# Patient Record
Sex: FEMALE | Race: WHITE | NOT HISPANIC OR LATINO | Employment: OTHER | URBAN - NONMETROPOLITAN AREA
[De-identification: names, ages, dates, MRNs, and addresses within clinical notes are randomized per-mention and may not be internally consistent; named-entity substitution may affect disease eponyms.]

---

## 2020-03-09 ENCOUNTER — TRANSCRIBE ORDERS (OUTPATIENT)
Dept: ADMINISTRATIVE | Facility: HOSPITAL | Age: 60
End: 2020-03-09

## 2020-03-09 DIAGNOSIS — E04.1 NONTOXIC SINGLE THYROID NODULE: Primary | ICD-10-CM

## 2020-04-29 ENCOUNTER — TRANSCRIBE ORDERS (OUTPATIENT)
Dept: ADMINISTRATIVE | Facility: HOSPITAL | Age: 60
End: 2020-04-29

## 2020-04-29 DIAGNOSIS — E04.1 NONTOXIC SINGLE THYROID NODULE: Primary | ICD-10-CM

## 2020-04-30 ENCOUNTER — TRANSCRIBE ORDERS (OUTPATIENT)
Dept: ADMINISTRATIVE | Facility: HOSPITAL | Age: 60
End: 2020-04-30

## 2020-04-30 DIAGNOSIS — M79.671 PAIN IN RIGHT FOOT: Primary | ICD-10-CM

## 2020-05-01 ENCOUNTER — TRANSCRIBE ORDERS (OUTPATIENT)
Dept: ADMINISTRATIVE | Facility: HOSPITAL | Age: 60
End: 2020-05-01

## 2020-05-01 DIAGNOSIS — E04.1 NONTOXIC UNINODULAR GOITER: ICD-10-CM

## 2020-05-01 DIAGNOSIS — E04.1 NONTOXIC SINGLE THYROID NODULE: ICD-10-CM

## 2020-05-01 DIAGNOSIS — M79.671 PAIN IN RIGHT FOOT: Primary | ICD-10-CM

## 2020-05-01 DIAGNOSIS — M79.671 RIGHT FOOT PAIN: Primary | ICD-10-CM

## 2020-05-04 ENCOUNTER — HOSPITAL ENCOUNTER (OUTPATIENT)
Dept: ULTRASOUND IMAGING | Facility: HOSPITAL | Age: 60
Discharge: HOME | End: 2020-05-04
Admitting: FAMILY MEDICINE

## 2020-05-04 DIAGNOSIS — E04.1 NONTOXIC SINGLE THYROID NODULE: ICD-10-CM

## 2020-05-04 DIAGNOSIS — M79.671 PAIN IN RIGHT FOOT: ICD-10-CM

## 2020-05-04 PROCEDURE — 76536 US EXAM OF HEAD AND NECK: CPT

## 2020-05-04 PROCEDURE — 76536 US EXAM OF HEAD AND NECK: CPT | Performed by: RADIOLOGY

## 2021-01-14 ENCOUNTER — APPOINTMENT (OUTPATIENT)
Dept: GENERAL RADIOLOGY | Facility: HOSPITAL | Age: 61
End: 2021-01-14

## 2021-01-14 ENCOUNTER — HOSPITAL ENCOUNTER (EMERGENCY)
Facility: HOSPITAL | Age: 61
Discharge: HOME | End: 2021-01-14
Attending: EMERGENCY MEDICINE | Admitting: EMERGENCY MEDICINE

## 2021-01-14 VITALS
SYSTOLIC BLOOD PRESSURE: 187 MMHG | TEMPERATURE: 99 F | BODY MASS INDEX: 37.56 KG/M2 | HEART RATE: 82 BPM | RESPIRATION RATE: 18 BRPM | WEIGHT: 220 LBS | OXYGEN SATURATION: 96 % | DIASTOLIC BLOOD PRESSURE: 87 MMHG | HEIGHT: 64 IN

## 2021-01-14 DIAGNOSIS — R07.9 CHEST PAIN IN ADULT: Primary | ICD-10-CM

## 2021-01-14 LAB
ALBUMIN SERPL-MCNC: 4.25 G/DL (ref 3.5–5.2)
ALBUMIN/GLOB SERPL: 1.4 G/DL
ALP SERPL-CCNC: 82 U/L (ref 39–117)
ALT SERPL W P-5'-P-CCNC: 25 U/L (ref 1–33)
ANION GAP SERPL CALCULATED.3IONS-SCNC: 8.7 MMOL/L (ref 5–15)
AST SERPL-CCNC: 17 U/L (ref 1–32)
BASOPHILS # BLD AUTO: 0.04 10*3/MM3 (ref 0–0.2)
BASOPHILS NFR BLD AUTO: 0.4 % (ref 0–1.5)
BILIRUB SERPL-MCNC: 0.3 MG/DL (ref 0–1.2)
BUN SERPL-MCNC: 12 MG/DL (ref 6–20)
BUN/CREAT SERPL: 14.8 (ref 7–25)
CALCIUM SPEC-SCNC: 9.2 MG/DL (ref 8.6–10.5)
CHLORIDE SERPL-SCNC: 99 MMOL/L (ref 98–107)
CO2 SERPL-SCNC: 27.3 MMOL/L (ref 22–29)
CREAT SERPL-MCNC: 0.81 MG/DL (ref 0.57–1)
DEPRECATED RDW RBC AUTO: 43.2 FL (ref 37–54)
EOSINOPHIL # BLD AUTO: 0.27 10*3/MM3 (ref 0–0.4)
EOSINOPHIL NFR BLD AUTO: 2.5 % (ref 0.3–6.2)
ERYTHROCYTE [DISTWIDTH] IN BLOOD BY AUTOMATED COUNT: 13.3 % (ref 12.3–15.4)
GFR SERPL CREATININE-BSD FRML MDRD: 72 ML/MIN/1.73
GLOBULIN UR ELPH-MCNC: 3 GM/DL
GLUCOSE SERPL-MCNC: 119 MG/DL (ref 65–99)
HCT VFR BLD AUTO: 43 % (ref 34–46.6)
HGB BLD-MCNC: 13.1 G/DL (ref 12–15.9)
HOLD SPECIMEN: NORMAL
HOLD SPECIMEN: NORMAL
IMM GRANULOCYTES # BLD AUTO: 0.04 10*3/MM3 (ref 0–0.05)
IMM GRANULOCYTES NFR BLD AUTO: 0.4 % (ref 0–0.5)
LYMPHOCYTES # BLD AUTO: 2.09 10*3/MM3 (ref 0.7–3.1)
LYMPHOCYTES NFR BLD AUTO: 19.1 % (ref 19.6–45.3)
MCH RBC QN AUTO: 27 PG (ref 26.6–33)
MCHC RBC AUTO-ENTMCNC: 30.5 G/DL (ref 31.5–35.7)
MCV RBC AUTO: 88.7 FL (ref 79–97)
MONOCYTES # BLD AUTO: 0.63 10*3/MM3 (ref 0.1–0.9)
MONOCYTES NFR BLD AUTO: 5.8 % (ref 5–12)
NEUTROPHILS NFR BLD AUTO: 7.85 10*3/MM3 (ref 1.7–7)
NEUTROPHILS NFR BLD AUTO: 71.8 % (ref 42.7–76)
NRBC BLD AUTO-RTO: 0 /100 WBC (ref 0–0.2)
PLATELET # BLD AUTO: 218 10*3/MM3 (ref 140–450)
PMV BLD AUTO: 10.5 FL (ref 6–12)
POTASSIUM SERPL-SCNC: 3.9 MMOL/L (ref 3.5–5.2)
PROT SERPL-MCNC: 7.2 G/DL (ref 6–8.5)
RBC # BLD AUTO: 4.85 10*6/MM3 (ref 3.77–5.28)
SODIUM SERPL-SCNC: 135 MMOL/L (ref 136–145)
TROPONIN T SERPL-MCNC: <0.01 NG/ML (ref 0–0.03)
TROPONIN T SERPL-MCNC: <0.01 NG/ML (ref 0–0.03)
WBC # BLD AUTO: 10.92 10*3/MM3 (ref 3.4–10.8)
WHOLE BLOOD HOLD SPECIMEN: NORMAL
WHOLE BLOOD HOLD SPECIMEN: NORMAL

## 2021-01-14 PROCEDURE — 99283 EMERGENCY DEPT VISIT LOW MDM: CPT

## 2021-01-14 PROCEDURE — 93005 ELECTROCARDIOGRAM TRACING: CPT | Performed by: PHYSICIAN ASSISTANT

## 2021-01-14 PROCEDURE — 85025 COMPLETE CBC W/AUTO DIFF WBC: CPT | Performed by: PHYSICIAN ASSISTANT

## 2021-01-14 PROCEDURE — 71045 X-RAY EXAM CHEST 1 VIEW: CPT | Performed by: RADIOLOGY

## 2021-01-14 PROCEDURE — 71045 X-RAY EXAM CHEST 1 VIEW: CPT

## 2021-01-14 PROCEDURE — 84484 ASSAY OF TROPONIN QUANT: CPT | Performed by: PHYSICIAN ASSISTANT

## 2021-01-14 PROCEDURE — 93010 ELECTROCARDIOGRAM REPORT: CPT | Performed by: INTERNAL MEDICINE

## 2021-01-14 PROCEDURE — 80053 COMPREHEN METABOLIC PANEL: CPT | Performed by: PHYSICIAN ASSISTANT

## 2021-01-14 RX ORDER — ASPIRIN 81 MG/1
324 TABLET, CHEWABLE ORAL ONCE
Status: COMPLETED | OUTPATIENT
Start: 2021-01-14 | End: 2021-01-14

## 2021-01-14 RX ORDER — SODIUM CHLORIDE 0.9 % (FLUSH) 0.9 %
10 SYRINGE (ML) INJECTION AS NEEDED
Status: DISCONTINUED | OUTPATIENT
Start: 2021-01-14 | End: 2021-01-14 | Stop reason: HOSPADM

## 2021-01-14 RX ADMIN — ASPIRIN 324 MG: 81 TABLET, CHEWABLE ORAL at 16:42

## 2021-01-14 NOTE — ED PROVIDER NOTES
Subjective   This is a 60-year-old female who presents to the emergency department chief complaint left-sided chest pressure x2 days.  Patient states she has had intermittent left-sided chest pressure over the past 2 days.  States the pain is not worse with exertion.  Denies any associated dyspnea or shortness of breath.  No reported dizziness.  Patient does have history of hypertension.  Denies previous history of heart disease, hyperlipidemia and diabetes.       History provided by:  Patient   used: No    Chest Pain  Pain location:  L chest  Pain quality: pressure    Pain quality: not aching    Pain radiates to:  Does not radiate  Pain severity:  Moderate  Onset quality:  Gradual  Duration:  2 days  Timing:  Intermittent  Progression:  Worsening  Chronicity:  New  Context: not breathing, not drug use, not intercourse, not lifting, not movement, not raising an arm and not at rest    Relieved by:  Nothing  Worsened by:  Nothing  Ineffective treatments:  None tried  Associated symptoms: shortness of breath    Associated symptoms: no abdominal pain, no anorexia, no anxiety, no back pain, no claudication and no palpitations    Risk factors: no birth control, no diabetes mellitus, no hypertension, no immobilization, not male, no Marfan's syndrome, not pregnant, no prior DVT/PE, no smoking and no surgery        Review of Systems   Respiratory: Positive for chest tightness and shortness of breath.    Cardiovascular: Positive for chest pain. Negative for palpitations, claudication and leg swelling.   Gastrointestinal: Negative.  Negative for abdominal pain and anorexia.   Genitourinary: Negative.  Negative for flank pain and genital sores.   Musculoskeletal: Negative.  Negative for arthralgias, back pain, joint swelling and neck pain.   Skin: Negative.  Negative for color change, pallor and rash.   All other systems reviewed and are negative.      No past medical history on file.    Allergies   Allergen  Reactions   • Penicillins Anaphylaxis   • Flagyl [Metronidazole] Hives       No past surgical history on file.    No family history on file.    Social History     Socioeconomic History   • Marital status:      Spouse name: Not on file   • Number of children: Not on file   • Years of education: Not on file   • Highest education level: Not on file           Objective   Physical Exam  Vitals signs and nursing note reviewed.   Constitutional:       General: She is not in acute distress.     Appearance: She is well-developed and normal weight. She is not ill-appearing or toxic-appearing.   HENT:      Head: Normocephalic and atraumatic.   Eyes:      Extraocular Movements: Extraocular movements intact.      Pupils: Pupils are equal, round, and reactive to light.   Neck:      Musculoskeletal: Normal range of motion and neck supple.      Thyroid: No thyromegaly.      Vascular: No hepatojugular reflux or JVD.      Trachea: No tracheal deviation.   Cardiovascular:      Rate and Rhythm: Normal rate and regular rhythm.      Pulses:           Carotid pulses are 2+ on the right side and 2+ on the left side.       Radial pulses are 2+ on the right side and 2+ on the left side.        Dorsalis pedis pulses are 2+ on the right side and 2+ on the left side.        Posterior tibial pulses are 2+ on the right side and 2+ on the left side.      Heart sounds: Normal heart sounds. Heart sounds not distant.   Pulmonary:      Effort: Pulmonary effort is normal. No tachypnea, accessory muscle usage or respiratory distress.      Breath sounds: Normal breath sounds. No stridor. No decreased breath sounds or wheezing.   Chest:      Chest wall: No mass, deformity, tenderness, crepitus or edema. There is no dullness to percussion.   Abdominal:      General: Bowel sounds are normal. There is no abdominal bruit.      Palpations: Abdomen is soft. There is no fluid wave, hepatomegaly, splenomegaly or mass.      Tenderness: There is no  abdominal tenderness. There is no guarding or rebound.   Musculoskeletal: Normal range of motion.      Right lower leg: She exhibits no tenderness. No edema.      Left lower leg: She exhibits no tenderness. No edema.   Lymphadenopathy:      Cervical: No cervical adenopathy.   Skin:     General: Skin is warm and dry.      Capillary Refill: Capillary refill takes less than 2 seconds.      Coloration: Skin is not cyanotic or pale.      Findings: No ecchymosis, erythema or rash.      Nails: There is no clubbing.     Neurological:      General: No focal deficit present.      Mental Status: She is alert and oriented to person, place, and time.      Cranial Nerves: No cranial nerve deficit.      Motor: No weakness.   Psychiatric:         Mood and Affect: Mood normal. Mood is not anxious.         Behavior: Behavior normal. Behavior is not agitated.         Procedures           ED Course  ED Course as of Jan 14 1906   Thu Jan 14, 2021 1904 IMPRESSION:    Unremarkable exam. No acute cardiopulmonary findings identified.    []   1904 60-year-old female that presents to the emergency department chief complaint left sided chest pain that has been present for the last couple days.  Patient did have negative cardiac work-up.  She is advised to follow-up with primary care physician.  Patient will be discharged at this time.    []      ED Course User Index  [] Harry Dillon PA-C                                         HEART Score (for prediction of 6-week risk of major adverse cardiac event) reviewed and/or performed as part of the patient evaluation and treatment planning process.  The result associated with this review/performance is: 2       MDM    Final diagnoses:   Chest pain in adult            Harry Dillon PA-C  01/14/21 1906

## 2021-01-15 LAB
QT INTERVAL: 376 MS
QTC INTERVAL: 436 MS

## 2021-01-22 ENCOUNTER — TRANSCRIBE ORDERS (OUTPATIENT)
Dept: ADMINISTRATIVE | Facility: HOSPITAL | Age: 61
End: 2021-01-22

## 2021-01-22 DIAGNOSIS — R10.12 LEFT UPPER QUADRANT PAIN: Primary | ICD-10-CM

## 2021-01-29 ENCOUNTER — HOSPITAL ENCOUNTER (OUTPATIENT)
Dept: CT IMAGING | Facility: HOSPITAL | Age: 61
Discharge: HOME | End: 2021-01-29

## 2021-01-29 ENCOUNTER — HOSPITAL ENCOUNTER (OUTPATIENT)
Dept: MAMMOGRAPHY | Facility: HOSPITAL | Age: 61
Discharge: HOME | End: 2021-01-29

## 2021-01-29 DIAGNOSIS — R10.12 LEFT UPPER QUADRANT PAIN: ICD-10-CM

## 2021-01-29 DIAGNOSIS — Z12.31 VISIT FOR SCREENING MAMMOGRAM: ICD-10-CM

## 2021-01-29 PROCEDURE — 77063 BREAST TOMOSYNTHESIS BI: CPT | Performed by: RADIOLOGY

## 2021-01-29 PROCEDURE — 74150 CT ABDOMEN W/O CONTRAST: CPT | Performed by: RADIOLOGY

## 2021-01-29 PROCEDURE — 74150 CT ABDOMEN W/O CONTRAST: CPT

## 2021-01-29 PROCEDURE — 77067 SCR MAMMO BI INCL CAD: CPT | Performed by: RADIOLOGY

## 2021-01-29 PROCEDURE — 77067 SCR MAMMO BI INCL CAD: CPT

## 2021-01-29 PROCEDURE — 77063 BREAST TOMOSYNTHESIS BI: CPT

## 2021-02-23 ENCOUNTER — OFFICE VISIT (OUTPATIENT)
Dept: FAMILY MEDICINE CLINIC | Facility: CLINIC | Age: 61
End: 2021-02-23

## 2021-02-23 VITALS
HEART RATE: 82 BPM | WEIGHT: 255 LBS | SYSTOLIC BLOOD PRESSURE: 130 MMHG | RESPIRATION RATE: 18 BRPM | OXYGEN SATURATION: 96 % | DIASTOLIC BLOOD PRESSURE: 80 MMHG | HEIGHT: 64 IN | TEMPERATURE: 97.8 F | BODY MASS INDEX: 43.54 KG/M2

## 2021-02-23 DIAGNOSIS — I10 ESSENTIAL HYPERTENSION: Chronic | ICD-10-CM

## 2021-02-23 DIAGNOSIS — N20.0 RENAL STONES: Primary | ICD-10-CM

## 2021-02-23 DIAGNOSIS — F33.0 MILD EPISODE OF RECURRENT MAJOR DEPRESSIVE DISORDER (HCC): ICD-10-CM

## 2021-02-23 DIAGNOSIS — F41.9 CHRONIC ANXIETY: ICD-10-CM

## 2021-02-23 PROBLEM — H81.13 BENIGN PAROXYSMAL POSITIONAL VERTIGO DUE TO BILATERAL VESTIBULAR DISORDER: Status: ACTIVE | Noted: 2021-02-23

## 2021-02-23 PROBLEM — F32.9 CURRENT EPISODE OF MAJOR DEPRESSIVE DISORDER WITHOUT PRIOR EPISODE: Chronic | Status: ACTIVE | Noted: 2021-02-23

## 2021-02-23 PROBLEM — F32.9 CURRENT EPISODE OF MAJOR DEPRESSIVE DISORDER WITHOUT PRIOR EPISODE: Status: ACTIVE | Noted: 2021-02-23

## 2021-02-23 PROBLEM — F41.0 ANXIETY ATTACK: Chronic | Status: ACTIVE | Noted: 2021-02-23

## 2021-02-23 PROBLEM — F32.9 CURRENT EPISODE OF MAJOR DEPRESSIVE DISORDER WITHOUT PRIOR EPISODE: Chronic | Status: RESOLVED | Noted: 2021-02-23 | Resolved: 2021-02-23

## 2021-02-23 PROBLEM — F41.0 ANXIETY ATTACK: Chronic | Status: RESOLVED | Noted: 2021-02-23 | Resolved: 2021-02-23

## 2021-02-23 PROBLEM — F41.0 ANXIETY ATTACK: Status: ACTIVE | Noted: 2021-02-23

## 2021-02-23 PROBLEM — H81.13 BENIGN PAROXYSMAL POSITIONAL VERTIGO DUE TO BILATERAL VESTIBULAR DISORDER: Chronic | Status: ACTIVE | Noted: 2021-02-23

## 2021-02-23 PROCEDURE — 99203 OFFICE O/P NEW LOW 30 MIN: CPT | Performed by: FAMILY MEDICINE

## 2021-02-23 PROCEDURE — 81001 URINALYSIS AUTO W/SCOPE: CPT | Performed by: FAMILY MEDICINE

## 2021-02-23 PROCEDURE — 87086 URINE CULTURE/COLONY COUNT: CPT | Performed by: FAMILY MEDICINE

## 2021-02-23 PROCEDURE — 36415 COLL VENOUS BLD VENIPUNCTURE: CPT | Performed by: FAMILY MEDICINE

## 2021-02-23 PROCEDURE — 80053 COMPREHEN METABOLIC PANEL: CPT | Performed by: FAMILY MEDICINE

## 2021-02-23 RX ORDER — NADOLOL 40 MG/1
40 TABLET ORAL DAILY
COMMUNITY
Start: 2021-02-15 | End: 2021-05-05 | Stop reason: SDUPTHER

## 2021-02-23 RX ORDER — ESCITALOPRAM OXALATE 10 MG/1
10 TABLET ORAL DAILY
COMMUNITY
Start: 2021-02-15 | End: 2021-05-05 | Stop reason: SDUPTHER

## 2021-02-23 NOTE — PROGRESS NOTES
Subjective   Ana Lilia Leach is a 60 y.o. female.     History of Present Illness comes in to establish care.  Past medical history of hypertension BPPV depression anxiety with superimposed panic attacks.  Has had some surgical procedures as noted in record.  Recent [facility] emergency room visits.  Recent abdominal imaging demonstrating right-sided ureteral stone as well as left sided renal pelvic stones.  At present denies respiratory GI CDV  symptoms.  Interestingly has had no pain consistent with renal colic.  No prior history of renal stones.  Because of the anxiety and panic usually associated with the BPPV has episodically taken lorazepam in past.    The following portions of the patient's history were reviewed and updated as appropriate: allergies, current medications, past family history, past medical history, past surgical history and problem list.    Review of Systems  See history of Present Illness     Objective     Physical Exam  Vitals signs reviewed.   Constitutional:       Appearance: Normal appearance.   HENT:      Head: Normocephalic.      Right Ear: External ear normal.      Left Ear: External ear normal.   Neck:      Musculoskeletal: Normal range of motion and neck supple.   Cardiovascular:      Rate and Rhythm: Normal rate and regular rhythm.      Heart sounds: Normal heart sounds.   Pulmonary:      Effort: Pulmonary effort is normal.      Breath sounds: Normal breath sounds.   Skin:     General: Skin is warm and dry.   Neurological:      Mental Status: She is alert and oriented to person, place, and time.   Psychiatric:         Mood and Affect: Mood normal.         PHQ-9 Total Score: 0    Patient's Body mass index is 43.75 kg/m². BMI is above normal parameters. Recommendations include: exercise counseling and nutrition counseling.   (Normal BMI:  18.5-24.9, OW 25-29.9, Obesity 30 or greater)      Assessment/Plan     Diagnoses and all orders for this visit:    1. Renal stones  (Primary)  -     Comprehensive Metabolic Panel  -     Urinalysis With Culture If Indicated - Urine, Clean Catch  -     Ambulatory Referral to Urology    2. Essential hypertension  -     Comprehensive Metabolic Panel    3. Mild episode of recurrent major depressive disorder (CMS/HCC)  -     Ambulatory Referral to Psychiatry    4. Chronic anxiety  -     Ambulatory Referral to Psychiatry    Today we will check urinalysis as well as follow-up chemistry panel to monitor renal function.  We reviewed available records including ER visit CT imaging.  Will make referral to urology.  In regard to the depression with anxiety panic attacks will make referral to psychiatry for further medication management.  Recheck here in 2 months or as needed.  Will of course notify you of results and appointments.                     This document has been electronically signed by Hank Marin MD   February 23, 2021 16:37 EST    Part of this note may be an electronic transcription/translation of spoken language to printed text using the Dragon Dictation System.

## 2021-02-24 ENCOUNTER — HOSPITAL ENCOUNTER (OUTPATIENT)
Dept: GENERAL RADIOLOGY | Facility: HOSPITAL | Age: 61
Discharge: HOME | End: 2021-02-24
Admitting: UROLOGY

## 2021-02-24 ENCOUNTER — OFFICE VISIT (OUTPATIENT)
Dept: UROLOGY | Facility: CLINIC | Age: 61
End: 2021-02-24

## 2021-02-24 ENCOUNTER — HOSPITAL ENCOUNTER (OUTPATIENT)
Facility: HOSPITAL | Age: 61
Setting detail: HOSPITAL OUTPATIENT SURGERY
End: 2021-02-24
Attending: UROLOGY | Admitting: UROLOGY

## 2021-02-24 VITALS — BODY MASS INDEX: 43.71 KG/M2 | TEMPERATURE: 97.6 F | HEIGHT: 64 IN | WEIGHT: 256 LBS

## 2021-02-24 DIAGNOSIS — N20.0 KIDNEY STONES: Primary | ICD-10-CM

## 2021-02-24 DIAGNOSIS — N26.1 RENAL ATROPHY, RIGHT: ICD-10-CM

## 2021-02-24 DIAGNOSIS — N39.0 URINARY TRACT INFECTION WITHOUT HEMATURIA, SITE UNSPECIFIED: ICD-10-CM

## 2021-02-24 DIAGNOSIS — N20.1 URETERAL STONE: ICD-10-CM

## 2021-02-24 LAB
ALBUMIN SERPL-MCNC: 4 G/DL (ref 3.5–5.2)
ALBUMIN/GLOB SERPL: 1.4 G/DL
ALP SERPL-CCNC: 68 U/L (ref 39–117)
ALT SERPL W P-5'-P-CCNC: 20 U/L (ref 1–33)
ANION GAP SERPL CALCULATED.3IONS-SCNC: 10.1 MMOL/L (ref 5–15)
AST SERPL-CCNC: 16 U/L (ref 1–32)
BACTERIA UR QL AUTO: ABNORMAL /HPF
BILIRUB SERPL-MCNC: 0.2 MG/DL (ref 0–1.2)
BILIRUB UR QL STRIP: NEGATIVE
BUN SERPL-MCNC: 11 MG/DL (ref 6–20)
BUN/CREAT SERPL: 17.7 (ref 7–25)
CALCIUM SPEC-SCNC: 8.9 MG/DL (ref 8.6–10.5)
CHLORIDE SERPL-SCNC: 100 MMOL/L (ref 98–107)
CLARITY UR: CLEAR
CO2 SERPL-SCNC: 26.9 MMOL/L (ref 22–29)
COLOR UR: YELLOW
CREAT SERPL-MCNC: 0.62 MG/DL (ref 0.57–1)
GFR SERPL CREATININE-BSD FRML MDRD: 99 ML/MIN/1.73
GLOBULIN UR ELPH-MCNC: 2.8 GM/DL
GLUCOSE SERPL-MCNC: 96 MG/DL (ref 65–99)
GLUCOSE UR STRIP-MCNC: NEGATIVE MG/DL
HGB UR QL STRIP.AUTO: ABNORMAL
HYALINE CASTS UR QL AUTO: ABNORMAL /LPF
KETONES UR QL STRIP: NEGATIVE
LEUKOCYTE ESTERASE UR QL STRIP.AUTO: ABNORMAL
NITRITE UR QL STRIP: NEGATIVE
PH UR STRIP.AUTO: 6.5 [PH] (ref 5–8)
POTASSIUM SERPL-SCNC: 4.5 MMOL/L (ref 3.5–5.2)
PROT SERPL-MCNC: 6.8 G/DL (ref 6–8.5)
PROT UR QL STRIP: NEGATIVE
RBC # UR: ABNORMAL /HPF
REF LAB TEST METHOD: ABNORMAL
SODIUM SERPL-SCNC: 137 MMOL/L (ref 136–145)
SP GR UR STRIP: 1.01 (ref 1–1.03)
SQUAMOUS #/AREA URNS HPF: ABNORMAL /HPF
UROBILINOGEN UR QL STRIP: ABNORMAL
WBC UR QL AUTO: ABNORMAL /HPF

## 2021-02-24 PROCEDURE — 74018 RADEX ABDOMEN 1 VIEW: CPT | Performed by: RADIOLOGY

## 2021-02-24 PROCEDURE — 99204 OFFICE O/P NEW MOD 45 MIN: CPT | Performed by: UROLOGY

## 2021-02-24 PROCEDURE — 74018 RADEX ABDOMEN 1 VIEW: CPT

## 2021-02-24 RX ORDER — SULFAMETHOXAZOLE AND TRIMETHOPRIM 400; 80 MG/1; MG/1
1 TABLET ORAL 2 TIMES DAILY
Qty: 30 TABLET | Refills: 0 | Status: SHIPPED | OUTPATIENT
Start: 2021-02-24 | End: 2021-04-26

## 2021-02-24 NOTE — PROGRESS NOTES
Chief Complaint:          stones    HPI:   60 y.o. female.  Pt has epigastric pain radiating to the back.  Pt denies flank pain.  Pt had a ct scan that showed a large 1.5 cm right renal pelvic stone.   With marked right renal atrophy with no significant hydronephrosis.  Pt's renal function is 0.62 creat.  HPI      Past Medical History:        Past Medical History:   Diagnosis Date   • Anxiety    • Headache    • Hypertension    • PVC's (premature ventricular contractions)    • Thyroid disease          Current Meds:     Current Outpatient Medications   Medication Sig Dispense Refill   • escitalopram (LEXAPRO) 10 MG tablet Take 10 mg by mouth Daily.     • nadolol (CORGARD) 40 MG tablet Take 40 mg by mouth Daily.       No current facility-administered medications for this visit.         Allergies:      Allergies   Allergen Reactions   • Penicillins Anaphylaxis   • Flagyl [Metronidazole] Hives   • Aspirin Anxiety        Past Surgical History:     Past Surgical History:   Procedure Laterality Date   • GALLBLADDER SURGERY     • OVARY SURGERY Right    • TUBAL ABDOMINAL LIGATION           Social History:     Social History     Socioeconomic History   • Marital status:      Spouse name: Not on file   • Number of children: Not on file   • Years of education: Not on file   • Highest education level: Not on file   Tobacco Use   • Smoking status: Former Smoker     Quit date: 2009     Years since quittin.0   Substance and Sexual Activity   • Alcohol use: Never     Frequency: Never   • Drug use: Never   • Sexual activity: Not Currently     Partners: Female, Male       Family History:     Family History   Problem Relation Age of Onset   • Arthritis Mother    • Diabetes Mother    • Cancer Mother    • Hypertension Mother    • Cancer Sister    • Diabetes Son    • Breast cancer Neg Hx        Review of Systems:     Review of Systems   Constitutional: Negative for chills, fatigue and fever.   HENT: Negative for  congestion and sinus pressure.    Respiratory: Negative for chest tightness and shortness of breath.    Cardiovascular: Negative for chest pain.   Gastrointestinal: Positive for abdominal pain. Negative for constipation, diarrhea, nausea and vomiting.   Genitourinary: Negative for dysuria, frequency, hematuria and urgency.   Musculoskeletal: Negative for back pain and neck pain.   Neurological: Positive for headaches. Negative for dizziness.   Hematological: Bruises/bleeds easily.   Psychiatric/Behavioral: The patient is not nervous/anxious.          Physical Exam:     Physical Exam  Constitutional:       Appearance: She is well-developed.      Comments: Morbidly obese   HENT:      Head: Normocephalic.      Right Ear: External ear normal.      Left Ear: External ear normal.      Nose: Nose normal.   Eyes:      Conjunctiva/sclera: Conjunctivae normal.      Pupils: Pupils are equal, round, and reactive to light.   Neck:      Musculoskeletal: Normal range of motion and neck supple.      Thyroid: No thyromegaly.      Trachea: No tracheal deviation.   Cardiovascular:      Heart sounds: Normal heart sounds. No murmur. No friction rub. No gallop.    Pulmonary:      Effort: Pulmonary effort is normal. No respiratory distress.      Breath sounds: Normal breath sounds. No wheezing or rales.   Chest:      Chest wall: No tenderness.   Abdominal:      General: Bowel sounds are normal. There is no distension.      Palpations: Abdomen is soft. There is no mass.      Tenderness: There is no abdominal tenderness. There is no guarding or rebound.      Hernia: No hernia is present.   Genitourinary:     Vagina: Normal.   Musculoskeletal: Normal range of motion.   Lymphadenopathy:      Cervical: No cervical adenopathy.   Skin:     General: Skin is warm.      Findings: No rash.   Neurological:      Mental Status: She is alert and oriented to person, place, and time.      Cranial Nerves: No cranial nerve deficit.      Motor: No abnormal  "muscle tone.      Coordination: Coordination normal.      Deep Tendon Reflexes: Reflexes normal.   Psychiatric:         Judgment: Judgment normal.         Ht 162.6 cm (64.02\")   Wt 116 kg (256 lb)   BMI 43.91 kg/m²    Procedure:         Assessment:     Encounter Diagnosis   Name Primary?   • Kidney stones Yes       Orders Placed This Encounter   Procedures   • XR Abdomen KUB     Order Specific Question:   Reason for Exam:     Answer:   kidney stones       Patient reports that she is not currently experiencing any symptoms of urinary incontinence.      Plan:   I would recommend treating the large ureteral stone on the right but I do not expect the right kidney to recover much from her degree of atrophy.  I would recommend right ureteroscopy laser lithotripsy and stent placement.  Down the line I would treat the left side.    Patient's Body mass index is 43.91 kg/m². BMI is above normal parameters. Recommendations include: referral to primary care.      Smoking Cessation Counseling:  Never a smoker.  Patient is going to follow up with PCP to discuss treatment/medication options to quit tobacco use.     Counseling was given to patient for the following topics instructions for management as follows: ureteral stone. The interim medical history and current results were reviewed.  A treatment plan with follow-up was made for Kidney stones [N20.0]   This document has been electronically signed by Mateus Quevedo MD February 24, 2021 13:56 EST      "

## 2021-02-25 ENCOUNTER — TELEPHONE (OUTPATIENT)
Dept: FAMILY MEDICINE CLINIC | Facility: CLINIC | Age: 61
End: 2021-02-25

## 2021-02-25 LAB — BACTERIA SPEC AEROBE CULT: NORMAL

## 2021-03-01 NOTE — TELEPHONE ENCOUNTER
PT CALLED STATING SHE WAS TOLD THAT SHE COULD NOT SEE ANOTHER PROVIDER IN DR [facility]BERTO OFFICE    PT STATED SHE WAS UPSET ABOUT THIS     PT STATED SHE FOUND ANOTHER UROLOGIST IN [place]- [facility] WITH [facility]    PT CALL NUMBER: [phone]

## 2021-03-02 ENCOUNTER — APPOINTMENT (OUTPATIENT)
Dept: PREADMISSION TESTING | Facility: HOSPITAL | Age: 61
End: 2021-03-02

## 2021-03-02 DIAGNOSIS — N20.0 RENAL STONES: Primary | ICD-10-CM

## 2021-03-02 NOTE — TELEPHONE ENCOUNTER
I am unfamiliar with her requested provider.  Please be sure she is referred to physician and not midlevel provider for this concern.

## 2021-03-09 ENCOUNTER — OFFICE VISIT (OUTPATIENT)
Dept: UROLOGY | Facility: CLINIC | Age: 61
End: 2021-03-09

## 2021-03-09 VITALS — BODY MASS INDEX: 43.66 KG/M2 | WEIGHT: 255.73 LBS | HEIGHT: 64 IN | TEMPERATURE: 98.4 F

## 2021-03-09 DIAGNOSIS — N20.1 URETERAL STONE: ICD-10-CM

## 2021-03-09 DIAGNOSIS — N20.0 RENAL STONES: Primary | ICD-10-CM

## 2021-03-09 PROCEDURE — 99214 OFFICE O/P EST MOD 30 MIN: CPT | Performed by: UROLOGY

## 2021-03-09 NOTE — PROGRESS NOTES
Chief Complaint:          Chief Complaint   Patient presents with   • Nephrolithiasis     wants 2nd opinion       HPI:   60 y.o. female here for second opinion.  She has a right number of the kidney with a 10 mm proximal ureteral stone and a left kidney with compensatory hypertrophy and a small concretion of stones in the lower pole calyx there is no hydronephrosis, no infections, no pain, clearly because the compensatory hypertrophy of the stone on the right has been present although she has no recollection of it.  It may have been when she was pregnant.  I discussed the indications for surgical intervention am to get a nuclear medicine scan to determine the function to see if we need to even address it if not I will recommend observation none monitoring for metabolic activity I will see her back in 6 months pending the results of the scan    Past Medical History:        Past Medical History:   Diagnosis Date   • Anxiety    • Headache    • Hypertension    • Kidney stone    • PVC's (premature ventricular contractions)    • Thyroid disease          Current Meds:     Current Outpatient Medications   Medication Sig Dispense Refill   • escitalopram (LEXAPRO) 10 MG tablet Take 10 mg by mouth Daily.     • nadolol (CORGARD) 40 MG tablet Take 40 mg by mouth Daily.     • sulfamethoxazole-trimethoprim (Bactrim) 400-80 MG tablet Take 1 tablet by mouth 2 (Two) Times a Day. 30 tablet 0     No current facility-administered medications for this visit.        Allergies:      Allergies   Allergen Reactions   • Penicillins Anaphylaxis   • Flagyl [Metronidazole] Hives   • Aspirin Anxiety        Past Surgical History:     Past Surgical History:   Procedure Laterality Date   • GALLBLADDER SURGERY     • OVARY SURGERY Right    • TUBAL ABDOMINAL LIGATION           Social History:     Social History     Socioeconomic History   • Marital status:      Spouse name: Not on file   • Number of children: Not on file   • Years of education:  Not on file   • Highest education level: Not on file   Tobacco Use   • Smoking status: Former Smoker     Quit date: 2009     Years since quittin.0   • Smokeless tobacco: Never Used   Substance and Sexual Activity   • Alcohol use: Never   • Drug use: Never   • Sexual activity: Not Currently     Partners: Female, Male       Family History:     Family History   Problem Relation Age of Onset   • Arthritis Mother    • Diabetes Mother    • Cancer Mother    • Hypertension Mother    • Cancer Sister    • Diabetes Son    • Breast cancer Neg Hx        Review of Systems:     Review of Systems   Constitutional: Negative.  Negative for activity change, appetite change, chills, diaphoresis, fatigue and unexpected weight change.   HENT: Negative for congestion, dental problem, drooling, ear discharge, ear pain, facial swelling, hearing loss, mouth sores, nosebleeds, postnasal drip, rhinorrhea, sinus pressure, sneezing, sore throat, tinnitus, trouble swallowing and voice change.    Eyes: Negative.  Negative for photophobia, pain, discharge, redness, itching and visual disturbance.   Respiratory: Negative.  Negative for apnea, cough, choking, chest tightness, shortness of breath, wheezing and stridor.    Cardiovascular: Negative.  Negative for chest pain, palpitations and leg swelling.   Gastrointestinal: Negative.  Negative for abdominal distention, abdominal pain, anal bleeding, blood in stool, constipation, diarrhea, nausea, rectal pain and vomiting.   Endocrine: Negative.  Negative for cold intolerance, heat intolerance, polydipsia, polyphagia and polyuria.   Musculoskeletal: Negative.  Negative for arthralgias, back pain, gait problem, joint swelling, myalgias, neck pain and neck stiffness.   Skin: Negative.  Negative for color change, pallor, rash and wound.   Allergic/Immunologic: Negative.  Negative for environmental allergies, food allergies and immunocompromised state.   Neurological: Negative.  Negative for  dizziness, tremors, seizures, syncope, facial asymmetry, speech difficulty, weakness, light-headedness, numbness and headaches.   Hematological: Negative.  Negative for adenopathy. Does not bruise/bleed easily.   Psychiatric/Behavioral: Negative for agitation, behavioral problems, confusion, decreased concentration, dysphoric mood, hallucinations, self-injury, sleep disturbance and suicidal ideas. The patient is not nervous/anxious and is not hyperactive.    All other systems reviewed and are negative.      Physical Exam:     Physical Exam  Constitutional:       Appearance: She is well-developed.   HENT:      Head: Normocephalic and atraumatic.      Right Ear: External ear normal.      Left Ear: External ear normal.   Eyes:      Conjunctiva/sclera: Conjunctivae normal.      Pupils: Pupils are equal, round, and reactive to light.   Cardiovascular:      Rate and Rhythm: Normal rate and regular rhythm.      Heart sounds: Normal heart sounds.   Pulmonary:      Effort: Pulmonary effort is normal.      Breath sounds: Normal breath sounds.   Abdominal:      General: Bowel sounds are normal. There is no distension.      Palpations: Abdomen is soft. There is no mass.      Tenderness: There is no abdominal tenderness. There is no guarding or rebound.   Genitourinary:     Vagina: No vaginal discharge.   Musculoskeletal:         General: Normal range of motion.   Skin:     General: Skin is warm and dry.   Neurological:      Mental Status: She is alert.      Deep Tendon Reflexes: Reflexes are normal and symmetric.   Psychiatric:         Behavior: Behavior normal.         Thought Content: Thought content normal.         Judgment: Judgment normal.         I have reviewed the following portions of the patient's history: allergies, current medications, past family history, past medical history, past social history, past surgical history, problem list and ROS and confirm it's accurate.      Procedure:       Assessment/Plan:      Nonfunctional right kidney with 10 mm proximal stone probably present for a long time based on the compensatory hypertrophy of the contralateral kidney recommend observation we will check a nuclear medicine scan to determine percent function of is less than 10% I recommend observation  Renal calculus-we discussed the presence of the stone we discussed the various therapeutic options available including percutaneous nephrostolithotomy, lithotripsy.  We discussed the risks of lithotripsy including the passage of stones the development of a large string of stones in the distal ureter known as Steinstrasse.  In the 3% incidence of that we will need to proceed with a ureteroscopy for obstructing fragments.  Extremely rare incidence of renal hematoma.  And the significance of this.  We discussed the absolute relative indicators for intervention including the presence of sepsis, and pain we cannot control is the primary need for urgent intervention.  We discussed placement of a stent if indicated and the management of the stent as well.  Is a right lower pole collection of nonobstructing stones with no infection and no complications or pain recommend observation            Patient's Body mass index is 43.87 kg/m². BMI is above normal parameters. Recommendations include: educational material.              This document has been electronically signed by STEPHANIE SARKAR MD March 9, 2021 13:14 EST

## 2021-03-17 ENCOUNTER — OFFICE VISIT (OUTPATIENT)
Dept: PSYCHIATRY | Facility: CLINIC | Age: 61
End: 2021-03-17

## 2021-03-17 VITALS
HEART RATE: 93 BPM | DIASTOLIC BLOOD PRESSURE: 82 MMHG | TEMPERATURE: 98 F | WEIGHT: 250 LBS | HEIGHT: 64 IN | SYSTOLIC BLOOD PRESSURE: 178 MMHG | BODY MASS INDEX: 42.68 KG/M2

## 2021-03-17 DIAGNOSIS — F33.1 MODERATE EPISODE OF RECURRENT MAJOR DEPRESSIVE DISORDER (HCC): ICD-10-CM

## 2021-03-17 DIAGNOSIS — F41.1 GENERALIZED ANXIETY DISORDER: Primary | ICD-10-CM

## 2021-03-17 PROCEDURE — 90792 PSYCH DIAG EVAL W/MED SRVCS: CPT | Performed by: NURSE PRACTITIONER

## 2021-03-17 NOTE — PROGRESS NOTES
Subjective   Ana Lilia Leach is a 60 y.o. female who is here today for initial appointment to evaluate for medication options after being referred by Dr Marin for anxiety.     Chief Complaint:      History of Present Illness  She states that she has chronic anxiety with dizziness.  She states that for a very long time she was a mess, she thought she was losing her mind and she was prescribed lexapro which has been very helpful for her.  She states that she is most interested in starting back on ativan which she was on in [place] for panic associated with dizziness.  She states that the dizziness is related to medical issues and she receives benefits for that.  Discussed with patient that ativan is often associated with dizziness but patient argued and states that was the only thing that helped her, she was previously on diazepam but patient states that it took too long for it to help her and while she was in the ER once she was given ativan and it helped a lot quicker.  Discussed with patient other options that are available to help with panic disorder as BNZ treatment is not the first line of treatment for her symptoms, she became upset and stated that she did not like to be categorized with drug abusers in [place].  Reviewed other options with patient for her anxiety, including vistaril and other non-controlled/nonaddictive medications for the anxiety but patient states that she was not interested in that type of medication because it made her more fatigued with her current health issues.  Also, reviewed possible therapy to address her panic disorder with techniques to help reduce the severity of symptoms but patient stated that it was not helpful in a time of panic.  She refused all alternatives and commented that she was going to have to contact her son in [place] to get ativan to mail to [place] so that she could take them illegally. She left the office stating that this was a waste of her time.       The rest of the information was gathered from her psychosocial.      Past Psych History:  Denies any history of trauma or abuse.     Previous Psych Meds: diazepam, lorazepam    Substance Abuse:  Smoking history with LAURA 2009: 1-2 ppd    Social History: She is currently renting in [facility] with some college, she moved from [place] and is disabled,  X 1 year.  Close relationship with her two adult sons.      Family Psychiatric History: Son has MH issues    Family Medical History: Arthritis in her mother; Cancer in her mother and sister; Diabetes in her mother and son; Hypertension in her mother.    Medical/Surgical History:  BC: tubal ligation.     Past Medical History:   Diagnosis Date   • Anxiety    • Headache    • Hypertension    • Kidney stone    • PVC's (premature ventricular contractions)    • Thyroid disease      Past Surgical History:   Procedure Laterality Date   • GALLBLADDER SURGERY     • OVARY SURGERY Right    • TUBAL ABDOMINAL LIGATION         Allergies   Allergen Reactions   • Penicillins Anaphylaxis   • Flagyl [Metronidazole] Hives   • Aspirin Anxiety       Current Medications:   Current Outpatient Medications   Medication Sig Dispense Refill   • escitalopram (LEXAPRO) 10 MG tablet Take 10 mg by mouth Daily.     • nadolol (CORGARD) 40 MG tablet Take 40 mg by mouth Daily.     • sulfamethoxazole-trimethoprim (Bactrim) 400-80 MG tablet Take 1 tablet by mouth 2 (Two) Times a Day. 30 tablet 0     No current facility-administered medications for this visit.     Review of Systems   Constitutional: Negative for appetite change, chills, diaphoresis, fatigue, fever and unexpected weight change.   HENT: Negative for hearing loss, sore throat, trouble swallowing and voice change.    Eyes: Negative for photophobia and visual disturbance.   Respiratory: Negative for cough, chest tightness and shortness of breath.    Cardiovascular: Negative for chest pain and palpitations.   Gastrointestinal:  "Negative for abdominal pain, constipation, nausea and vomiting.   Endocrine: Negative for cold intolerance and heat intolerance.   Genitourinary: Negative for dysuria and frequency.   Musculoskeletal: Negative for arthralgias, back pain, joint swelling and neck stiffness.   Skin: Negative for color change and wound.   Allergic/Immunologic: Negative for environmental allergies and immunocompromised state.   Neurological: Negative for dizziness, tremors, seizures, syncope, weakness, light-headedness and headaches.   Hematological: Negative for adenopathy. Does not bruise/bleed easily.        Objective   Physical Exam  Vitals reviewed.   Constitutional:       General: She is not in acute distress.     Appearance: She is well-developed.   Neurological:      Mental Status: She is alert.      Coordination: Coordination normal.      Gait: Gait normal.       Blood pressure 178/82, pulse 93, temperature 98 °F (36.7 °C), temperature source Temporal, height 162.6 cm (64.02\"), weight 113 kg (250 lb).    Mental Status Exam:   Hygiene:   good  Cooperation:  Agitated  Eye Contact:  Fair  Psychomotor Behavior:  Aggitated  Affect:  Restricted  Hopelessness: Denies  Speech:  Normal  Thought Process:  Linear  Thought Content:  Mood congruent  Suicidal:  None  Homicidal:  None  Hallucinations:  None  Delusion:  None  Memory:  Intact  Orientation:  Person, Place, Time and Situation  Reliability:  fair  Insight:  Fair  Judgement:  Fair  Impulse Control:  Fair  Physical/Medical Issues:  No     Short-term goals: Patient will be compliant with clinic appointments.  Patient will be engaged in therapy, medication compliant with minimal side effects. Patient  will report decrease of symptoms and frequency.    Long-term goals: Patient will have minimal symptoms of  with continued medication management. Patient will be compliant with treatment and appointments.       Problem list: Anxiety  Strengths: taking lexapro  Weaknesses: " unknown    Assessment/Plan   Problems Addressed this Visit     None      Visit Diagnoses     Generalized anxiety disorder    -  Primary    Moderate episode of recurrent major depressive disorder (CMS/HCC)          Diagnoses       Codes Comments    Generalized anxiety disorder    -  Primary ICD-10-CM: F41.1  ICD-9-CM: 300.02     Moderate episode of recurrent major depressive disorder (CMS/HCC)     ICD-10-CM: F33.1  ICD-9-CM: 296.32         Patient left without completing initial evaluation.  She was made aware of medication/therapy options.  Patient was upset but did not appear to be in any acute psychological distress.      PHQ-9 indicated moderate depression, PRANEETH-7 indicated moderate anxiety.

## 2021-03-18 ENCOUNTER — APPOINTMENT (OUTPATIENT)
Dept: NUCLEAR MEDICINE | Facility: HOSPITAL | Age: 61
End: 2021-03-18

## 2021-03-26 ENCOUNTER — OFFICE VISIT (OUTPATIENT)
Dept: FAMILY MEDICINE CLINIC | Facility: CLINIC | Age: 61
End: 2021-03-26

## 2021-03-26 VITALS
DIASTOLIC BLOOD PRESSURE: 82 MMHG | HEIGHT: 64 IN | OXYGEN SATURATION: 98 % | TEMPERATURE: 96.9 F | SYSTOLIC BLOOD PRESSURE: 162 MMHG | HEART RATE: 91 BPM | BODY MASS INDEX: 43.29 KG/M2 | WEIGHT: 253.6 LBS

## 2021-03-26 DIAGNOSIS — I10 ESSENTIAL HYPERTENSION: Primary | Chronic | ICD-10-CM

## 2021-03-26 DIAGNOSIS — H81.13 BENIGN PAROXYSMAL POSITIONAL VERTIGO DUE TO BILATERAL VESTIBULAR DISORDER: Chronic | ICD-10-CM

## 2021-03-26 PROCEDURE — 99212 OFFICE O/P EST SF 10 MIN: CPT | Performed by: NURSE PRACTITIONER

## 2021-03-26 NOTE — PATIENT INSTRUCTIONS

## 2021-03-26 NOTE — PROGRESS NOTES
"Subjective   Ana Lilia Leach is a 60 y.o. female.     Chief Complaint   Patient presents with   • Follow-up       She presents requesting a letter saying she has established care here for social security. She states they require that because she has not changed from her  name after her divorce and her 's license is  and has been for a year. She does not want to spend the money on a new 's license before she changes her name. She c/o some long standing dizziness that she takes care of.        The following portions of the patient's history were reviewed and updated as appropriate: allergies, current medications, past family history, past medical history, past social history, past surgical history and problem list.    Review of Systems   Constitutional: Negative for fatigue, fever and unexpected weight change.   HENT: Negative for ear pain, rhinorrhea and sore throat.    Eyes: Negative for visual disturbance.   Respiratory: Negative for cough, chest tightness and shortness of breath.    Cardiovascular: Negative for chest pain, palpitations and leg swelling.   Gastrointestinal: Negative for abdominal pain, blood in stool, constipation, diarrhea, nausea and vomiting.   Endocrine: Negative for cold intolerance and heat intolerance.   Genitourinary: Negative for dysuria.   Musculoskeletal: Negative for arthralgias and myalgias.   Skin: Negative for color change.   Allergic/Immunologic: Negative for environmental allergies.   Hematological: Negative for adenopathy.   Psychiatric/Behavioral: Negative for suicidal ideas. The patient is not nervous/anxious.        Objective     /82 (BP Location: Right arm, Patient Position: Sitting, Cuff Size: Adult)   Pulse 91   Temp 96.9 °F (36.1 °C) (Temporal)   Ht 162.6 cm (64.02\")   Wt 115 kg (253 lb 9.6 oz)   SpO2 98%   BMI 43.51 kg/m²     Physical Exam  Constitutional:       General: She is not in acute distress.     Appearance: Normal " appearance. She is well-developed. She is not diaphoretic.   HENT:      Head: Normocephalic and atraumatic.   Cardiovascular:      Rate and Rhythm: Normal rate and regular rhythm.      Heart sounds: Normal heart sounds, S1 normal and S2 normal. No murmur heard.   No friction rub. No gallop.    Pulmonary:      Effort: Pulmonary effort is normal. No respiratory distress.      Breath sounds: Normal breath sounds.   Abdominal:      General: Bowel sounds are normal. There is no distension.      Palpations: Abdomen is soft.      Tenderness: There is no abdominal tenderness.   Skin:     General: Skin is warm and dry.   Neurological:      Mental Status: She is alert and oriented to person, place, and time.   Psychiatric:         Behavior: Behavior normal.         Thought Content: Thought content normal.         Judgment: Judgment normal.         Assessment/Plan     Problem List Items Addressed This Visit        Cardiac and Vasculature    Essential hypertension - Primary (Chronic)       ENT    Benign paroxysmal positional vertigo due to bilateral vestibular disorder (Chronic)          Plan: Letter printed for patient stating the date of her establishment here and her second visit here.    Patient's Body mass index is 43.51 kg/m². BMI is above normal parameters. Recommendations include: educational material.   (Normal BMI:  18.5-24.9, OW 25-29.9, Obesity 30 or greater)           Understands disease processes and need for medications.  Understands reasons for urgent and emergent care.  Patient (& family) verbalized agreement for treatment plan.   Emotional support and active listening provided.  Patient provided time to verbalize feelings.               This document has been electronically signed by MICHAEL Desai   March 26, 2021 16:18 EDT

## 2021-04-08 ENCOUNTER — APPOINTMENT (OUTPATIENT)
Dept: NUCLEAR MEDICINE | Facility: HOSPITAL | Age: 61
End: 2021-04-08

## 2021-04-14 ENCOUNTER — TELEPHONE (OUTPATIENT)
Dept: UROLOGY | Facility: CLINIC | Age: 61
End: 2021-04-14

## 2021-04-14 ENCOUNTER — APPOINTMENT (OUTPATIENT)
Dept: NUCLEAR MEDICINE | Facility: HOSPITAL | Age: 61
End: 2021-04-14

## 2021-04-14 NOTE — TELEPHONE ENCOUNTER
Patient called and stated that she could not do the kidney function test because of her vertigo. Patient stated she will call us if she needs to come in sooner for an appt.

## 2021-04-26 ENCOUNTER — OFFICE VISIT (OUTPATIENT)
Dept: FAMILY MEDICINE CLINIC | Facility: CLINIC | Age: 61
End: 2021-04-26

## 2021-04-26 VITALS
TEMPERATURE: 97.3 F | SYSTOLIC BLOOD PRESSURE: 142 MMHG | HEIGHT: 64 IN | HEART RATE: 93 BPM | BODY MASS INDEX: 43.46 KG/M2 | OXYGEN SATURATION: 98 % | WEIGHT: 254.6 LBS | DIASTOLIC BLOOD PRESSURE: 90 MMHG

## 2021-04-26 DIAGNOSIS — Z00.00 MEDICARE ANNUAL WELLNESS VISIT, SUBSEQUENT: Primary | ICD-10-CM

## 2021-04-26 DIAGNOSIS — Z12.11 COLON CANCER SCREENING: ICD-10-CM

## 2021-04-26 DIAGNOSIS — Z78.0 POST-MENOPAUSAL: ICD-10-CM

## 2021-04-26 DIAGNOSIS — E04.1 THYROID NODULE: ICD-10-CM

## 2021-04-26 PROBLEM — N20.0 RENAL STONES: Chronic | Status: ACTIVE | Noted: 2021-02-23

## 2021-04-26 PROBLEM — N20.1 URETERAL STONE: Chronic | Status: ACTIVE | Noted: 2021-02-24

## 2021-04-26 PROCEDURE — 1126F AMNT PAIN NOTED NONE PRSNT: CPT | Performed by: FAMILY MEDICINE

## 2021-04-26 PROCEDURE — 1159F MED LIST DOCD IN RCRD: CPT | Performed by: FAMILY MEDICINE

## 2021-04-26 PROCEDURE — G0439 PPPS, SUBSEQ VISIT: HCPCS | Performed by: FAMILY MEDICINE

## 2021-04-26 PROCEDURE — 1170F FXNL STATUS ASSESSED: CPT | Performed by: FAMILY MEDICINE

## 2021-04-26 PROCEDURE — 96160 PT-FOCUSED HLTH RISK ASSMT: CPT | Performed by: FAMILY MEDICINE

## 2021-04-26 NOTE — PROGRESS NOTES
The ABCs of the Annual Wellness Visit  Subsequent Medicare Wellness Visit    Chief Complaint   Patient presents with   • Medicare Wellness-subsequent       Subjective   History of Present Illness:  Ana Lilia Leach is a 60 y.o. female who presents for a Subsequent Medicare Wellness Visit.  Globally no new problems.  Had visit with urologist.  Plan to observe the renal stones.  Had visit with behavioral health.  Those records are reviewed.  Had mammogram this year.  Colonoscopy several years ago.  No history of DEXA scan.  Stop smoking about 12 years ago no low-dose CT screening done contemplating vaccines.  Historical thyroid nodule discovered last thyroid ultrasound 1 year ago roughly.  No Pap exam in a few years.  Denies respiratory CDV GI .  Trying to work on weight.  Having some brief onset of mechanical left jaw discomfort    HEALTH RISK ASSESSMENT    Recent Hospitalizations:  No hospitalization(s) within the last year.    Current Medical Providers:  Patient Care Team:  Hank Marin MD as PCP - General (Family Medicine)  Yg Penny MD (Family Medicine)    Smoking Status:  Social History     Tobacco Use   Smoking Status Former Smoker   • Packs/day: 1.50   • Years: 15.00   • Pack years: 22.50   • Types: Cigarettes   • Quit date: 2009   • Years since quittin.1   Smokeless Tobacco Never Used       Alcohol Consumption:  Social History     Substance and Sexual Activity   Alcohol Use Never       Depression Screen:   PHQ-2/PHQ-9 Depression Screening 2021   Little interest or pleasure in doing things 0   Feeling down, depressed, or hopeless 0   Trouble falling or staying asleep, or sleeping too much -   Feeling tired or having little energy -   Poor appetite or overeating -   Feeling bad about yourself - or that you are a failure or have let yourself or your family down -   Trouble concentrating on things, such as reading the newspaper or watching television -   Moving or  speaking so slowly that other people could have noticed. Or the opposite - being so fidgety or restless that you have been moving around a lot more than usual -   Thoughts that you would be better off dead, or of hurting yourself in some way -   Total Score 0   If you checked off any problems, how difficult have these problems made it for you to do your work, take care of things at home, or get along with other people? -       Fall Risk Screen:  CINDY Fall Risk Assessment has not been completed.    Health Habits and Functional and Cognitive Screening:  Functional & Cognitive Status 4/26/2021   Do you have difficulty preparing food and eating? Yes   Do you have difficulty bathing yourself, getting dressed or grooming yourself? No   Do you have difficulty using the toilet? No   Do you have difficulty moving around from place to place? No   Do you have trouble with steps or getting out of a bed or a chair? Yes   Current Diet Well Balanced Diet   Dental Exam Not up to date        Dental Exam Comment dentures   Eye Exam Not up to date   Exercise (times per week) 7 times per week   Current Exercise Activities Include Housecleaning   Do you need help using the phone?  No   Are you deaf or do you have serious difficulty hearing?  No   Do you need help with transportation? No   Do you need help shopping? Yes   Do you need help preparing meals?  Yes   Do you need help with housework?  Yes   Do you need help with laundry? Yes   Do you need help taking your medications? No   Do you need help managing money? No   Do you ever drive or ride in a car without wearing a seat belt? No         Does the patient have evidence of cognitive impairment? No    Asprin use counseling:Does not need ASA (and currently is not on it)    Age-appropriate Screening Schedule:  Refer to the list below for future screening recommendations based on patient's age, sex and/or medical conditions. Orders for these recommended tests are listed in the plan  section. The patient has been provided with a written plan.    Health Maintenance   Topic Date Due   • COLONOSCOPY  Never done   • TDAP/TD VACCINES (1 - Tdap) Never done   • ZOSTER VACCINE (1 of 2) Never done   • PAP SMEAR  Never done   • INFLUENZA VACCINE  08/01/2021   • MAMMOGRAM  01/29/2023          The following portions of the patient's history were reviewed and updated as appropriate: allergies, current medications, past medical history, past social history, past surgical history and problem list.    Outpatient Medications Prior to Visit   Medication Sig Dispense Refill   • escitalopram (LEXAPRO) 10 MG tablet Take 10 mg by mouth Daily.     • nadolol (CORGARD) 40 MG tablet Take 40 mg by mouth Daily.     • sulfamethoxazole-trimethoprim (Bactrim) 400-80 MG tablet Take 1 tablet by mouth 2 (Two) Times a Day. 30 tablet 0     No facility-administered medications prior to visit.       Patient Active Problem List   Diagnosis   • Essential hypertension   • Benign paroxysmal positional vertigo due to bilateral vestibular disorder   • Renal stones   • Mild episode of recurrent major depressive disorder (CMS/HCC)   • Chronic anxiety   • Ureteral stone   • Urinary tract infection without hematuria   • Thyroid nodule       Advanced Care Planning:  ACP discussion was held with the patient during this visit. Patient does not have an advance directive, information provided.    Review of Systems    Compared to one year ago, the patient feels her physical health is the same.  Compared to one year ago, the patient feels her mental health is the same.    Reviewed chart for potential of high risk medication in the elderly: yes  Reviewed chart for potential of harmful drug interactions in the elderly:yes    Objective         Vitals:    04/26/21 1407   BP: 142/90   BP Location: Right arm   Patient Position: Sitting   Cuff Size: Adult   Pulse: 93   Temp: 97.3 °F (36.3 °C)   TempSrc: Temporal   SpO2: 98%   Weight: 115 kg (254 lb 9.6 oz)  "  Height: 162.6 cm (64.02\")       Body mass index is 43.68 kg/m².  Discussed the patient's BMI with her. The BMI is above average; BMI management plan is completed.    Physical Exam  Vitals reviewed.   Constitutional:       Appearance: Normal appearance.   HENT:      Head: Normocephalic.      Right Ear: Tympanic membrane normal.      Left Ear: Tympanic membrane normal.   Eyes:      Conjunctiva/sclera: Conjunctivae normal.   Cardiovascular:      Rate and Rhythm: Normal rate and regular rhythm.      Heart sounds: Normal heart sounds.   Pulmonary:      Effort: Pulmonary effort is normal.      Breath sounds: Normal breath sounds.   Musculoskeletal:      Cervical back: Normal range of motion and neck supple.   Skin:     General: Skin is warm and dry.   Neurological:      Mental Status: She is alert and oriented to person, place, and time.   Psychiatric:         Mood and Affect: Mood normal.               Assessment/Plan   Medicare Risks and Personalized Health Plan  CMS Preventative Services Quick Reference  Advance Directive Discussion  Breast Cancer/Mammogram Screening  Immunizations Discussed/Encouraged (specific immunizations; Influenza, Shingrix and COVID19 )  Inactivity/Sedentary  Lung Cancer Risk  Obesity/Overweight   Osteoporosis Risk    The above risks/problems have been discussed with the patient.  Pertinent information has been shared with the patient in the After Visit Summary.  Follow up plans and orders are seen below in the Assessment/Plan Section.    Diagnoses and all orders for this visit:    1. Medicare annual wellness visit, subsequent (Primary)    2. Colon cancer screening  -     Amb Referral for Screening Colonoscopy (Every 10 years, 2 years if High Risk)    3. Post-menopausal  -     Dexa Bone Density, Axial (Every 2 Years); Future    4. Thyroid nodule  -     US Thyroid      Follow Up:  Return in about 6 months (around 10/26/2021).     An After Visit Summary and PPPS were given to the patient.   "   Will refer for colonoscopy DEXA scan.  Keep follow-up as recommended with urology.  Will check thyroid ultrasound.  Historically have less than 30 pack years of smoking and do not qualify it seems for low-dose CT screening.  Continue with smoking cessation.  Work diligently on TLC to get weight down.  I note 2 years ago you had negative hepatitis C virus antibody screening test.  Contemplate vaccines that we discussed of course.  I believe the left jaw symptoms could be very mild TMJ inflammation.  Consider episodic icing.  You seem to be managing the episodic dizziness adequately.  Would ask you to recheck here in about 6 months or as needed.

## 2021-04-29 ENCOUNTER — OFFICE VISIT (OUTPATIENT)
Dept: GASTROENTEROLOGY | Facility: CLINIC | Age: 61
End: 2021-04-29

## 2021-04-29 VITALS
BODY MASS INDEX: 42.78 KG/M2 | DIASTOLIC BLOOD PRESSURE: 92 MMHG | HEART RATE: 70 BPM | TEMPERATURE: 97.1 F | OXYGEN SATURATION: 94 % | SYSTOLIC BLOOD PRESSURE: 174 MMHG | HEIGHT: 64 IN | WEIGHT: 250.6 LBS

## 2021-04-29 DIAGNOSIS — R10.13 EPIGASTRIC PAIN: ICD-10-CM

## 2021-04-29 DIAGNOSIS — R10.12 LEFT UPPER QUADRANT ABDOMINAL PAIN: ICD-10-CM

## 2021-04-29 DIAGNOSIS — Z12.12 ENCOUNTER FOR COLORECTAL CANCER SCREENING: Primary | ICD-10-CM

## 2021-04-29 DIAGNOSIS — Z01.818 PREOPERATIVE CLEARANCE: Primary | ICD-10-CM

## 2021-04-29 DIAGNOSIS — Z12.11 ENCOUNTER FOR COLORECTAL CANCER SCREENING: Primary | ICD-10-CM

## 2021-04-29 DIAGNOSIS — Z12.11 ENCOUNTER FOR COLORECTAL CANCER SCREENING: ICD-10-CM

## 2021-04-29 DIAGNOSIS — Z12.12 ENCOUNTER FOR COLORECTAL CANCER SCREENING: ICD-10-CM

## 2021-04-29 PROCEDURE — 99204 OFFICE O/P NEW MOD 45 MIN: CPT | Performed by: INTERNAL MEDICINE

## 2021-04-29 RX ORDER — POLYETHYLENE GLYCOL 3350 17 G/17G
POWDER, FOR SOLUTION ORAL
Qty: 510 G | Refills: 0 | Status: SHIPPED | OUTPATIENT
Start: 2021-04-29 | End: 2021-05-19 | Stop reason: HOSPADM

## 2021-04-29 NOTE — PROGRESS NOTES
"Subjective     Ana Lilia Leach is a 60 y.o. female who presents to the office today as a consultation from Hank Marin,* for evaluation of Hiatal Hernia        History of Present Illness:  The patient presents to the clinic for abdominal pain described as \"a bulging feeling\" in the epigastrium.  Pain is described as a tenderness to touch.  Nothing tends to bring it on or make it better.  She was referred for a CT scan that showed a large hiatal hernia according to the patient. She is not anemic. She has gastritis frequently and feels like this causes her nausea.  There is no associated vomiting. The hernia was found incidentally. The patient is having pain in the epigastric area and left upper quadrant.  Patient had a colonoscopy in 2010; patient had one polyp.  There is no family history colon cancer.  Patient did have her gallbladder removed February of 2010; they we're unable to do a laparoscopic prodecure.  Her appetite is good.  She has had no weight loss.  There is no mention of a hiatal hernia or abdominal wall hernia on CT scan performed in January 2021.      Review of Systems:  Review of Systems   Constitutional: Negative for fever.   HENT: Negative for trouble swallowing.    Eyes: Negative.    Respiratory: Negative for chest tightness.    Cardiovascular: Negative for chest pain.   Gastrointestinal: Positive for abdominal distention, abdominal pain, diarrhea and nausea. Negative for anal bleeding, blood in stool and constipation.   Endocrine: Negative.    Genitourinary: Negative for difficulty urinating.   Musculoskeletal: Positive for back pain.   Skin: Negative.    Allergic/Immunologic: Negative for environmental allergies and food allergies.   Neurological: Positive for headaches.   Hematological: Does not bruise/bleed easily.   Psychiatric/Behavioral: Negative.        Past Medical History:  Past Medical History:   Diagnosis Date   • Anxiety    • Headache    • Hypertension    • Kidney " "stone    • PVC's (premature ventricular contractions)    • Thyroid disease        Past Surgical History:  Past Surgical History:   Procedure Laterality Date   • GALLBLADDER SURGERY     • OVARY SURGERY Right    • TUBAL ABDOMINAL LIGATION         Family History:  Family History   Problem Relation Age of Onset   • Arthritis Mother    • Diabetes Mother    • Cancer Mother    • Hypertension Mother    • Cancer Sister    • Diabetes Son    • Breast cancer Neg Hx        Social History:  Social History     Socioeconomic History   • Marital status:      Spouse name: Not on file   • Number of children: Not on file   • Years of education: Not on file   • Highest education level: Not on file   Tobacco Use   • Smoking status: Former Smoker     Packs/day: 1.50     Years: 15.00     Pack years: 22.50     Types: Cigarettes     Quit date: 2009     Years since quittin.1   • Smokeless tobacco: Never Used   Vaping Use   • Vaping Use: Never used   Substance and Sexual Activity   • Alcohol use: Never   • Drug use: Never   • Sexual activity: Not Currently     Partners: Female, Male       Current Medication List:    Current Outpatient Medications:   •  escitalopram (LEXAPRO) 10 MG tablet, Take 10 mg by mouth Daily., Disp: , Rfl:   •  nadolol (CORGARD) 40 MG tablet, Take 40 mg by mouth Daily., Disp: , Rfl:   •  polyethylene glycol (MIRALAX) 17 GM/SCOOP powder, Take 255 g of MiraLAX with 32 ounces liquid then repeat 8 hours later for MiraLAX cleanout., Disp: 510 g, Rfl: 0    Allergies:   Penicillins, Flagyl [metronidazole], and Aspirin    Vitals:  /92 (BP Location: Left arm, Patient Position: Sitting, Cuff Size: Adult)   Pulse 70   Temp 97.1 °F (36.2 °C)   Ht 162.6 cm (64\")   Wt 114 kg (250 lb 9.6 oz)   SpO2 94%   BMI 43.02 kg/m²     Physical Exam:  Physical Exam  Constitutional:       Appearance: She is obese.   HENT:      Head: Normocephalic and atraumatic.      Nose: Nose normal. No congestion or rhinorrhea. "   Eyes:      General: No scleral icterus.     Extraocular Movements: Extraocular movements intact.      Conjunctiva/sclera: Conjunctivae normal.      Pupils: Pupils are equal, round, and reactive to light.   Cardiovascular:      Rate and Rhythm: Normal rate and regular rhythm.      Pulses: Normal pulses.      Heart sounds: Normal heart sounds.   Pulmonary:      Effort: Pulmonary effort is normal.      Breath sounds: Normal breath sounds.   Abdominal:      General: Abdomen is flat. Bowel sounds are normal. There is no distension.      Palpations: Abdomen is soft. There is no shifting dullness, fluid wave, hepatomegaly, splenomegaly, mass or pulsatile mass.      Tenderness: There is abdominal tenderness (epigastic). There is no guarding or rebound.      Hernia: No hernia is present.   Musculoskeletal:         General: No swelling or tenderness.      Cervical back: Normal range of motion and neck supple.      Comments: Nonpitting edema in the lower extremities bilaterally   Skin:     General: Skin is warm and dry.      Coloration: Skin is not jaundiced.   Neurological:      General: No focal deficit present.      Mental Status: She is alert and oriented to person, place, and time.   Psychiatric:         Mood and Affect: Mood normal.         Behavior: Behavior normal.         Results Review:  Lab Results:   No visits with results within 1 Month(s) from this visit.   Latest known visit with results is:   Office Visit on 02/23/2021   Component Date Value Ref Range Status   • Glucose 02/23/2021 96  65 - 99 mg/dL Final   • BUN 02/23/2021 11  6 - 20 mg/dL Final   • Creatinine 02/23/2021 0.62  0.57 - 1.00 mg/dL Final   • Sodium 02/23/2021 137  136 - 145 mmol/L Final   • Potassium 02/23/2021 4.5  3.5 - 5.2 mmol/L Final   • Chloride 02/23/2021 100  98 - 107 mmol/L Final   • CO2 02/23/2021 26.9  22.0 - 29.0 mmol/L Final   • Calcium 02/23/2021 8.9  8.6 - 10.5 mg/dL Final   • Total Protein 02/23/2021 6.8  6.0 - 8.5 g/dL Final   •  Albumin 02/23/2021 4.00  3.50 - 5.20 g/dL Final   • ALT (SGPT) 02/23/2021 20  1 - 33 U/L Final   • AST (SGOT) 02/23/2021 16  1 - 32 U/L Final   • Alkaline Phosphatase 02/23/2021 68  39 - 117 U/L Final   • Total Bilirubin 02/23/2021 0.2  0.0 - 1.2 mg/dL Final   • eGFR Non  Amer 02/23/2021 99  >60 mL/min/1.73 Final   • Globulin 02/23/2021 2.8  gm/dL Final   • A/G Ratio 02/23/2021 1.4  g/dL Final   • BUN/Creatinine Ratio 02/23/2021 17.7  7.0 - 25.0 Final   • Anion Gap 02/23/2021 10.1  5.0 - 15.0 mmol/L Final   • Color, UA 02/23/2021 Yellow  Yellow, Straw Final   • Appearance, UA 02/23/2021 Clear  Clear Final   • pH, UA 02/23/2021 6.5  5.0 - 8.0 Final   • Specific Gravity, UA 02/23/2021 1.012  1.005 - 1.030 Final   • Glucose, UA 02/23/2021 Negative  Negative Final   • Ketones, UA 02/23/2021 Negative  Negative Final   • Bilirubin, UA 02/23/2021 Negative  Negative Final   • Blood, UA 02/23/2021 Small (1+)* Negative Final   • Protein, UA 02/23/2021 Negative  Negative Final   • Leuk Esterase, UA 02/23/2021 Small (1+)* Negative Final   • Nitrite, UA 02/23/2021 Negative  Negative Final   • Urobilinogen, UA 02/23/2021 0.2 E.U./dL  0.2 - 1.0 E.U./dL Final   • RBC, UA 02/23/2021 0-2  None Seen, 0-2 /HPF Final   • WBC, UA 02/23/2021 6-12* None Seen, 0-2 /HPF Final   • Bacteria, UA 02/23/2021 1+* None Seen /HPF Final   • Squamous Epithelial Cells, UA 02/23/2021 3-6* None Seen, 0-2 /HPF Final   • Hyaline Casts, UA 02/23/2021 0-2  None Seen /LPF Final   • Methodology 02/23/2021 Automated Microscopy   Final   • Urine Culture 02/23/2021 50,000 CFU/mL Mixed Sachi Isolated   Final       Assessment/Plan     Visit Diagnoses:    ICD-10-CM ICD-9-CM   1. Encounter for colorectal cancer screening  Z12.11 V76.51    Z12.12 V76.41   2. Epigastric pain  R10.13 789.06   3. Left upper quadrant abdominal pain  R10.12 789.02       Plan:  I will have the patient undergo upper endoscopy to evaluate her epigastric tenderness and left upper  quadrant abdominal pain.  She will also undergo screening colonoscopy.  Her last colonoscopy was in 2010.  She does believe that she had one polyp removed.  There is no family history of colon cancer.  I will also call radiology to have them reread the CT scan to see if there is indeed an abdominal wall hernia or large hiatal hernia seen.    ESOPHAGOGASTRODUODENOSCOPY WITH BIOPSY (N/A), COLONOSCOPY FOR SCREENING (N/A)      MEDICATION ISSUES:  Discussed medication options and treatment plan of prescribed medication as well as the risks, benefits, and side effects including potential falls, possible impaired driving and metabolic adversities among others. Patient is agreeable to call the office with any worsening of symptoms or onset of side effects. Patient is agreeable to call 911 or go to the nearest ER should he/she begin having SI/HI.     MEDS ORDERED DURING VISIT:  New Medications Ordered This Visit   Medications   • polyethylene glycol (MIRALAX) 17 GM/SCOOP powder     Sig: Take 255 g of MiraLAX with 32 ounces liquid then repeat 8 hours later for MiraLAX cleanout.     Dispense:  510 g     Refill:  0       Return Follow-up after procedures.             This document has been electronically signed by Carmel Lang MD   April 29, 2021 12:30 EDT        Part of this note may be an electronic transcription/translation of spoken language to printed text using the Dragon Dictation System.

## 2021-05-05 RX ORDER — ESCITALOPRAM OXALATE 10 MG/1
10 TABLET ORAL DAILY
Qty: 90 TABLET | Refills: 0 | Status: SHIPPED | OUTPATIENT
Start: 2021-05-05 | End: 2021-06-07 | Stop reason: SDUPTHER

## 2021-05-05 RX ORDER — NADOLOL 40 MG/1
40 TABLET ORAL DAILY
Qty: 90 TABLET | Refills: 0 | Status: SHIPPED | OUTPATIENT
Start: 2021-05-05 | End: 2021-05-17 | Stop reason: SDUPTHER

## 2021-05-07 ENCOUNTER — APPOINTMENT (OUTPATIENT)
Dept: BONE DENSITY | Facility: HOSPITAL | Age: 61
End: 2021-05-07

## 2021-05-07 ENCOUNTER — HOSPITAL ENCOUNTER (OUTPATIENT)
Dept: ULTRASOUND IMAGING | Facility: HOSPITAL | Age: 61
Discharge: HOME | End: 2021-05-07

## 2021-05-10 RX ORDER — NADOLOL 40 MG/1
40 TABLET ORAL DAILY
Qty: 90 TABLET | Refills: 0 | OUTPATIENT
Start: 2021-05-10

## 2021-05-11 PROBLEM — Z12.11 ENCOUNTER FOR COLORECTAL CANCER SCREENING: Status: ACTIVE | Noted: 2021-05-11

## 2021-05-11 PROBLEM — Z12.12 ENCOUNTER FOR COLORECTAL CANCER SCREENING: Status: ACTIVE | Noted: 2021-05-11

## 2021-05-12 ENCOUNTER — TELEPHONE (OUTPATIENT)
Dept: FAMILY MEDICINE CLINIC | Facility: CLINIC | Age: 61
End: 2021-05-12

## 2021-05-12 ENCOUNTER — HOSPITAL ENCOUNTER (OUTPATIENT)
Dept: ULTRASOUND IMAGING | Facility: HOSPITAL | Age: 61
Discharge: HOME | End: 2021-05-12
Admitting: FAMILY MEDICINE

## 2021-05-12 PROCEDURE — 76536 US EXAM OF HEAD AND NECK: CPT | Performed by: RADIOLOGY

## 2021-05-12 PROCEDURE — 76536 US EXAM OF HEAD AND NECK: CPT

## 2021-05-14 RX ORDER — NADOLOL 40 MG/1
40 TABLET ORAL DAILY
Qty: 90 TABLET | Refills: 0 | OUTPATIENT
Start: 2021-05-14

## 2021-05-17 ENCOUNTER — TELEPHONE (OUTPATIENT)
Dept: GASTROENTEROLOGY | Facility: CLINIC | Age: 61
End: 2021-05-17

## 2021-05-17 ENCOUNTER — LAB (OUTPATIENT)
Dept: LAB | Facility: HOSPITAL | Age: 61
End: 2021-05-17

## 2021-05-17 ENCOUNTER — TELEPHONE (OUTPATIENT)
Dept: FAMILY MEDICINE CLINIC | Facility: CLINIC | Age: 61
End: 2021-05-17

## 2021-05-17 DIAGNOSIS — Z01.818 PRE-OPERATIVE CLEARANCE: Primary | ICD-10-CM

## 2021-05-17 PROCEDURE — U0004 COV-19 TEST NON-CDC HGH THRU: HCPCS | Performed by: INTERNAL MEDICINE

## 2021-05-17 PROCEDURE — C9803 HOPD COVID-19 SPEC COLLECT: HCPCS

## 2021-05-17 RX ORDER — NADOLOL 40 MG/1
40 TABLET ORAL DAILY
Qty: 90 TABLET | Refills: 3 | Status: SHIPPED | OUTPATIENT
Start: 2021-05-17 | End: 2022-03-07

## 2021-05-17 NOTE — TELEPHONE ENCOUNTER
----- Message from Ana Lilia Leach sent at 5/14/2021 10:49 PM EDT -----  Regarding: Prescription Question  Contact: [phone]  Tova, I spoke with you on the phone on 05/12 regarding my prescription for Nadolol.  Instead of sending the prescription to [facility], it was sent it to [facility]. I had relayed to you that I would get the Nadolol through [facility] and then next time through [facility]. After we had spoken I called [facility] and found out that through them I would not have to pay anything, but through [facility] I would have to pay $15.00. I called you back right away to let you know this and was put on hold and then a message came on stating that the office was closed. I left you a message telling you to please send my prescription to [facility], because I only had two pills left.  I would appreciate it if you would take care of this. Thank you.

## 2021-05-17 NOTE — TELEPHONE ENCOUNTER
Patient needs NADOLOL sent to [facility]. It was already sent to [facility], but the copay is cheaper using mail order.

## 2021-05-17 NOTE — TELEPHONE ENCOUNTER
Dr. Jimenez, patient called and said that she was concerned about drinking the Miralax prep due to reading about it as it states do not take if you have kidney problems. She says she does and wanted to know if another bowel prep would be better for her to use?

## 2021-05-18 LAB — SARS-COV-2 RNA NOSE QL NAA+PROBE: NOT DETECTED

## 2021-05-18 NOTE — TELEPHONE ENCOUNTER
I called her last night.  I looked at her last labs that did not reveal any issues with her kidneys.  She is going to take the MiraLAX prep.

## 2021-05-19 ENCOUNTER — ANESTHESIA (OUTPATIENT)
Dept: PERIOP | Facility: HOSPITAL | Age: 61
End: 2021-05-19

## 2021-05-19 ENCOUNTER — ANESTHESIA EVENT (OUTPATIENT)
Dept: PERIOP | Facility: HOSPITAL | Age: 61
End: 2021-05-19

## 2021-05-19 ENCOUNTER — HOSPITAL ENCOUNTER (OUTPATIENT)
Facility: HOSPITAL | Age: 61
Setting detail: HOSPITAL OUTPATIENT SURGERY
Discharge: HOME | End: 2021-05-19
Attending: INTERNAL MEDICINE | Admitting: INTERNAL MEDICINE

## 2021-05-19 VITALS
HEIGHT: 64 IN | TEMPERATURE: 97.3 F | DIASTOLIC BLOOD PRESSURE: 90 MMHG | WEIGHT: 250 LBS | HEART RATE: 72 BPM | RESPIRATION RATE: 16 BRPM | OXYGEN SATURATION: 94 % | SYSTOLIC BLOOD PRESSURE: 143 MMHG | BODY MASS INDEX: 42.68 KG/M2

## 2021-05-19 DIAGNOSIS — Z12.11 ENCOUNTER FOR COLORECTAL CANCER SCREENING: ICD-10-CM

## 2021-05-19 DIAGNOSIS — Z12.12 ENCOUNTER FOR COLORECTAL CANCER SCREENING: ICD-10-CM

## 2021-05-19 PROCEDURE — 88305 TISSUE EXAM BY PATHOLOGIST: CPT | Performed by: INTERNAL MEDICINE

## 2021-05-19 PROCEDURE — 45385 COLONOSCOPY W/LESION REMOVAL: CPT | Performed by: INTERNAL MEDICINE

## 2021-05-19 PROCEDURE — 25010000002 PHENYLEPHRINE PER 1 ML: Performed by: NURSE ANESTHETIST, CERTIFIED REGISTERED

## 2021-05-19 PROCEDURE — 25010000002 PROPOFOL 10 MG/ML EMULSION: Performed by: NURSE ANESTHETIST, CERTIFIED REGISTERED

## 2021-05-19 PROCEDURE — 43239 EGD BIOPSY SINGLE/MULTIPLE: CPT | Performed by: INTERNAL MEDICINE

## 2021-05-19 RX ORDER — DROPERIDOL 2.5 MG/ML
0.62 INJECTION, SOLUTION INTRAMUSCULAR; INTRAVENOUS ONCE AS NEEDED
Status: CANCELLED | OUTPATIENT
Start: 2021-05-19

## 2021-05-19 RX ORDER — IPRATROPIUM BROMIDE AND ALBUTEROL SULFATE 2.5; .5 MG/3ML; MG/3ML
3 SOLUTION RESPIRATORY (INHALATION) ONCE AS NEEDED
Status: CANCELLED | OUTPATIENT
Start: 2021-05-19

## 2021-05-19 RX ORDER — FENTANYL CITRATE 50 UG/ML
50 INJECTION, SOLUTION INTRAMUSCULAR; INTRAVENOUS
Status: CANCELLED | OUTPATIENT
Start: 2021-05-19

## 2021-05-19 RX ORDER — OXYCODONE HYDROCHLORIDE AND ACETAMINOPHEN 5; 325 MG/1; MG/1
1 TABLET ORAL ONCE AS NEEDED
Status: CANCELLED | OUTPATIENT
Start: 2021-05-19

## 2021-05-19 RX ORDER — SODIUM CHLORIDE, SODIUM LACTATE, POTASSIUM CHLORIDE, CALCIUM CHLORIDE 600; 310; 30; 20 MG/100ML; MG/100ML; MG/100ML; MG/100ML
INJECTION, SOLUTION INTRAVENOUS CONTINUOUS PRN
Status: DISCONTINUED | OUTPATIENT
Start: 2021-05-19 | End: 2021-05-19 | Stop reason: SURG

## 2021-05-19 RX ORDER — SODIUM CHLORIDE, SODIUM LACTATE, POTASSIUM CHLORIDE, CALCIUM CHLORIDE 600; 310; 30; 20 MG/100ML; MG/100ML; MG/100ML; MG/100ML
100 INJECTION, SOLUTION INTRAVENOUS ONCE AS NEEDED
Status: CANCELLED | OUTPATIENT
Start: 2021-05-19

## 2021-05-19 RX ORDER — PROPOFOL 10 MG/ML
VIAL (ML) INTRAVENOUS AS NEEDED
Status: DISCONTINUED | OUTPATIENT
Start: 2021-05-19 | End: 2021-05-19 | Stop reason: SURG

## 2021-05-19 RX ORDER — SODIUM CHLORIDE, SODIUM LACTATE, POTASSIUM CHLORIDE, CALCIUM CHLORIDE 600; 310; 30; 20 MG/100ML; MG/100ML; MG/100ML; MG/100ML
125 INJECTION, SOLUTION INTRAVENOUS ONCE
Status: COMPLETED | OUTPATIENT
Start: 2021-05-19 | End: 2021-05-19

## 2021-05-19 RX ORDER — MEPERIDINE HYDROCHLORIDE 25 MG/ML
12.5 INJECTION INTRAMUSCULAR; INTRAVENOUS; SUBCUTANEOUS
Status: CANCELLED | OUTPATIENT
Start: 2021-05-19 | End: 2021-05-20

## 2021-05-19 RX ORDER — KETOROLAC TROMETHAMINE 30 MG/ML
30 INJECTION, SOLUTION INTRAMUSCULAR; INTRAVENOUS EVERY 6 HOURS PRN
Status: CANCELLED | OUTPATIENT
Start: 2021-05-19 | End: 2021-05-22

## 2021-05-19 RX ORDER — PANTOPRAZOLE SODIUM 40 MG/1
40 TABLET, DELAYED RELEASE ORAL DAILY
Qty: 90 TABLET | Refills: 3 | Status: SHIPPED | OUTPATIENT
Start: 2021-05-19

## 2021-05-19 RX ORDER — SODIUM CHLORIDE 0.9 % (FLUSH) 0.9 %
10 SYRINGE (ML) INJECTION EVERY 12 HOURS SCHEDULED
Status: DISCONTINUED | OUTPATIENT
Start: 2021-05-19 | End: 2021-05-19 | Stop reason: HOSPADM

## 2021-05-19 RX ORDER — ONDANSETRON 2 MG/ML
4 INJECTION INTRAMUSCULAR; INTRAVENOUS AS NEEDED
Status: CANCELLED | OUTPATIENT
Start: 2021-05-19

## 2021-05-19 RX ORDER — SODIUM CHLORIDE 0.9 % (FLUSH) 0.9 %
10 SYRINGE (ML) INJECTION AS NEEDED
Status: DISCONTINUED | OUTPATIENT
Start: 2021-05-19 | End: 2021-05-19 | Stop reason: HOSPADM

## 2021-05-19 RX ORDER — MIDAZOLAM HYDROCHLORIDE 1 MG/ML
1 INJECTION INTRAMUSCULAR; INTRAVENOUS
Status: DISCONTINUED | OUTPATIENT
Start: 2021-05-19 | End: 2021-05-19 | Stop reason: HOSPADM

## 2021-05-19 RX ADMIN — PROPOFOL 140 MCG/KG/MIN: 10 INJECTION, EMULSION INTRAVENOUS at 09:04

## 2021-05-19 RX ADMIN — PHENYLEPHRINE HYDROCHLORIDE 100 MCG: 10 INJECTION, SOLUTION INTRAMUSCULAR; INTRAVENOUS; SUBCUTANEOUS at 09:21

## 2021-05-19 RX ADMIN — PROPOFOL 50 MG: 10 INJECTION, EMULSION INTRAVENOUS at 09:04

## 2021-05-19 RX ADMIN — SODIUM CHLORIDE, POTASSIUM CHLORIDE, SODIUM LACTATE AND CALCIUM CHLORIDE 125 ML/HR: 600; 310; 30; 20 INJECTION, SOLUTION INTRAVENOUS at 08:35

## 2021-05-19 RX ADMIN — SODIUM CHLORIDE, POTASSIUM CHLORIDE, SODIUM LACTATE AND CALCIUM CHLORIDE: 600; 310; 30; 20 INJECTION, SOLUTION INTRAVENOUS at 09:02

## 2021-05-19 NOTE — ANESTHESIA PREPROCEDURE EVALUATION
Anesthesia Evaluation     Patient summary reviewed and Nursing notes reviewed   NPO Solid Status: > 8 hours  NPO Liquid Status: > 8 hours           Airway   Mallampati: II  TM distance: <3 FB  Dental    (+) upper dentures    Pulmonary     breath sounds clear to auscultation  Cardiovascular     Rate: normal    (+) hypertension,       Neuro/Psych  (+) headaches, psychiatric history,     GI/Hepatic/Renal/Endo    (+)   renal disease,     Musculoskeletal     Abdominal     Abdomen: soft.   Substance History      OB/GYN          Other                        Anesthesia Plan    ASA 3     general     intravenous induction     Anesthetic plan, all risks, benefits, and alternatives have been provided, discussed and informed consent has been obtained with: patient.    Plan discussed with CRNA.

## 2021-05-19 NOTE — ANESTHESIA POSTPROCEDURE EVALUATION
Patient: Ana Lilia Leach    Procedure Summary     Date: 05/19/21 Room / Location: [facility] OR  /  [facility] OR    Anesthesia Start: 0902 Anesthesia Stop: 0942    Procedures:       ESOPHAGOGASTRODUODENOSCOPY WITH BIOPSY (N/A Esophagus)      COLONOSCOPY FOR SCREENING (N/A ) Diagnosis:       Encounter for colorectal cancer screening      (Encounter for colorectal cancer screening [Z12.11, Z12.12])    Surgeons: Carmel Lang MD Provider: Kash Ragland MD    Anesthesia Type: general ASA Status: 3          Anesthesia Type: general    Vitals  Vitals Value Taken Time   /90 05/19/21 1014   Temp 97.3 °F (36.3 °C) 05/19/21 0944   Pulse 72 05/19/21 1014   Resp 16 05/19/21 1014   SpO2 94 % 05/19/21 1014           Post Anesthesia Care and Evaluation    Patient location during evaluation: PHASE II  Patient participation: complete - patient participated  Level of consciousness: awake and alert  Pain score: 0  Pain management: adequate  Airway patency: patent  Anesthetic complications: No anesthetic complications  PONV Status: controlled  Cardiovascular status: acceptable  Respiratory status: acceptable and room air  Hydration status: euvolemic  No anesthesia care post op

## 2021-05-20 LAB — LAB AP CASE REPORT: NORMAL

## 2021-06-07 RX ORDER — ESCITALOPRAM OXALATE 10 MG/1
10 TABLET ORAL DAILY
Qty: 90 TABLET | Refills: 0 | Status: SHIPPED | OUTPATIENT
Start: 2021-06-07 | End: 2021-08-20 | Stop reason: SDUPTHER

## 2021-06-08 ENCOUNTER — TELEPHONE (OUTPATIENT)
Dept: FAMILY MEDICINE CLINIC | Facility: CLINIC | Age: 61
End: 2021-06-08

## 2021-06-08 NOTE — TELEPHONE ENCOUNTER
PT CALLED TO CHECK STATUS OF HER HANDICAP STICKER, PT STATED THAT IT HAS BEEN WELL OVER A MONTH SINCE SHE HAS HEARD BACK.    PLEASE ADVISE.  CALL BACK:[phone]

## 2021-06-09 RX ORDER — ESCITALOPRAM OXALATE 10 MG/1
10 TABLET ORAL DAILY
Qty: 90 TABLET | Refills: 0 | Status: CANCELLED | OUTPATIENT
Start: 2021-06-09

## 2021-08-20 RX ORDER — ESCITALOPRAM OXALATE 10 MG/1
TABLET ORAL
Qty: 90 TABLET | Refills: 0 | Status: SHIPPED | OUTPATIENT
Start: 2021-08-20 | End: 2021-10-31

## 2021-09-23 ENCOUNTER — HOSPITAL ENCOUNTER (OUTPATIENT)
Dept: GENERAL RADIOLOGY | Facility: HOSPITAL | Age: 61
Discharge: HOME | End: 2021-09-23
Admitting: NURSE PRACTITIONER

## 2021-09-23 ENCOUNTER — OFFICE VISIT (OUTPATIENT)
Dept: FAMILY MEDICINE CLINIC | Facility: CLINIC | Age: 61
End: 2021-09-23

## 2021-09-23 VITALS
HEIGHT: 64 IN | SYSTOLIC BLOOD PRESSURE: 165 MMHG | OXYGEN SATURATION: 98 % | WEIGHT: 249 LBS | RESPIRATION RATE: 20 BRPM | DIASTOLIC BLOOD PRESSURE: 74 MMHG | BODY MASS INDEX: 42.51 KG/M2 | HEART RATE: 74 BPM | TEMPERATURE: 98 F

## 2021-09-23 DIAGNOSIS — M79.671 RIGHT FOOT PAIN: Primary | ICD-10-CM

## 2021-09-23 DIAGNOSIS — M79.671 RIGHT FOOT PAIN: ICD-10-CM

## 2021-09-23 PROCEDURE — 73630 X-RAY EXAM OF FOOT: CPT | Performed by: RADIOLOGY

## 2021-09-23 PROCEDURE — 99213 OFFICE O/P EST LOW 20 MIN: CPT | Performed by: NURSE PRACTITIONER

## 2021-09-23 PROCEDURE — 73630 X-RAY EXAM OF FOOT: CPT

## 2021-09-23 NOTE — PROGRESS NOTES
"Subjective   Ana Lilia Leach is a 61 y.o. female.     Chief Complaint   Patient presents with   • Foot Pain       She presents with c/o pain in her right foot. She has been doing a lot of walking. She has had bone spurs in the past. Sometimes the pain moves. She states it is affecting her walking and she doesn't want to stop. She has been icing it and stretching it.        The following portions of the patient's history were reviewed and updated as appropriate: allergies, current medications, past family history, past medical history, past social history, past surgical history and problem list.    Review of Systems   Constitutional: Negative for fatigue, fever and unexpected weight change.   HENT: Negative for ear pain, rhinorrhea and sore throat.    Eyes: Negative for visual disturbance.   Respiratory: Negative for cough, chest tightness and shortness of breath.    Cardiovascular: Negative for chest pain, palpitations and leg swelling.   Gastrointestinal: Negative for abdominal pain, blood in stool, constipation, diarrhea, nausea and vomiting.   Endocrine: Negative for cold intolerance and heat intolerance.   Genitourinary: Negative for dysuria.   Musculoskeletal: Positive for arthralgias. Negative for myalgias.   Skin: Negative for color change.   Allergic/Immunologic: Negative for environmental allergies.   Hematological: Negative for adenopathy.   Psychiatric/Behavioral: Negative for suicidal ideas. The patient is not nervous/anxious.        Objective     /74 (BP Location: Right arm, Patient Position: Sitting, Cuff Size: Adult)   Pulse 74   Temp 98 °F (36.7 °C) (Temporal)   Resp 20   Ht 162.6 cm (64.02\")   Wt 113 kg (249 lb)   SpO2 98%   BMI 42.72 kg/m²     Physical Exam  Vitals reviewed.   Constitutional:       General: She is not in acute distress.     Appearance: Normal appearance. She is well-developed. She is not diaphoretic.   HENT:      Head: Normocephalic and atraumatic. "   Cardiovascular:      Rate and Rhythm: Normal rate and regular rhythm.      Heart sounds: Normal heart sounds, S1 normal and S2 normal. No murmur heard.   No friction rub. No gallop.    Pulmonary:      Effort: Pulmonary effort is normal. No respiratory distress.      Breath sounds: Normal breath sounds.   Musculoskeletal:      Right foot: Tenderness and bony tenderness present.   Skin:     General: Skin is warm and dry.   Neurological:      Mental Status: She is alert and oriented to person, place, and time.   Psychiatric:         Behavior: Behavior normal.         Thought Content: Thought content normal.         Judgment: Judgment normal.         Assessment/Plan     Problem List Items Addressed This Visit     None      Visit Diagnoses     Right foot pain    -  Primary    Relevant Orders    XR Foot 3+ View Right          Plan: right plantar  to palpation. Get xray. Try compression band. Try ice and stretching. Follow up in 2 weeks if no better.     @Body mass index is 42.72 kg/m².              Understands disease processes and need for medications.  Understands reasons for urgent and emergent care.  Patient (& family) verbalized agreement for treatment plan.   Emotional support and active listening provided.  Patient provided time to verbalize feelings.               This document has been electronically signed by MICHAEL Desai   September 23, 2021 11:22 EDT

## 2021-09-24 ENCOUNTER — TELEPHONE (OUTPATIENT)
Dept: FAMILY MEDICINE CLINIC | Facility: CLINIC | Age: 61
End: 2021-09-24

## 2021-09-24 NOTE — TELEPHONE ENCOUNTER
Caller: Ana Lilia Leach    Relationship to patient: Self    Best call back number: [phone]    Patient is needing: PATIENT CALLED AND STATED THAT SHE DIDN'T KNOW A PHARMACY IN [place] THAT WOULD TAKE A PRESCRIPTION FOR COMPRESSION BAND FOR HER FOOT AND WAS ASKING TO SEE IF NURSE OR ALLEN WOULD NE ABLE TO SEND THAT SOMEWHERE TO GET FOR PATIENT     PLEASE ADVISE ASAP

## 2021-09-27 ENCOUNTER — TELEPHONE (OUTPATIENT)
Dept: FAMILY MEDICINE CLINIC | Facility: CLINIC | Age: 61
End: 2021-09-27

## 2021-09-27 NOTE — TELEPHONE ENCOUNTER
Patient was seen in office last week and she is requesting a foot brace sent into [facility] if at all possible.

## 2021-09-27 NOTE — TELEPHONE ENCOUNTER
I am unsure what type of device or brace that Gaby has in mind for this patient.  Please reach out to her.

## 2021-09-28 NOTE — TELEPHONE ENCOUNTER
PATIENT CALLED IN CHECKING FOR UPDATE. SHE STATED HER INS WILL PROVIDE WHAT SHE NEEDS AND PAY FOR THE COST. I ADVISED PATIENT SHE CAN BRING THE PRESCRIPTION AND FAX NUMBER AND WE WILL FAX FOR HER. SHE VOICED UNDERSTANDING.

## 2021-10-26 ENCOUNTER — OFFICE VISIT (OUTPATIENT)
Dept: FAMILY MEDICINE CLINIC | Facility: CLINIC | Age: 61
End: 2021-10-26

## 2021-10-26 VITALS
WEIGHT: 247.6 LBS | BODY MASS INDEX: 42.27 KG/M2 | OXYGEN SATURATION: 98 % | DIASTOLIC BLOOD PRESSURE: 86 MMHG | TEMPERATURE: 96.8 F | HEIGHT: 64 IN | SYSTOLIC BLOOD PRESSURE: 158 MMHG | HEART RATE: 94 BPM

## 2021-10-26 DIAGNOSIS — M72.2 PLANTAR FASCIITIS OF RIGHT FOOT: ICD-10-CM

## 2021-10-26 DIAGNOSIS — I10 ESSENTIAL HYPERTENSION: Primary | Chronic | ICD-10-CM

## 2021-10-26 DIAGNOSIS — Z78.0 POST-MENOPAUSAL: ICD-10-CM

## 2021-10-26 PROCEDURE — 99213 OFFICE O/P EST LOW 20 MIN: CPT | Performed by: FAMILY MEDICINE

## 2021-10-26 PROCEDURE — 36415 COLL VENOUS BLD VENIPUNCTURE: CPT | Performed by: FAMILY MEDICINE

## 2021-10-26 NOTE — PROGRESS NOTES
Subjective   Ana Lilia Leach is a 61 y.o. female.     History of Present Illness follow-up hypertension.  Globally no significant new problems.  Current with mammogram.  Did not get the DEXA scan desires rescheduled.  Utilizing medications as reconciled.  Saw GI had upper and lower endoscopy.  Diagnosis of GERD.  Denies respiratory CDV current GI  skin concerns.  Having persistent right plantar discomfort.  Impairing exercise routines.  Has tried some splints and plans to get heel insert.    The following portions of the patient's history were reviewed and updated as appropriate: allergies, past family history, past medical history, past social history, past surgical history and problem list.    Review of Systems  See history of Present Illness     Objective     Physical Exam  Vitals reviewed.   Constitutional:       Appearance: Normal appearance.   HENT:      Head: Normocephalic.   Eyes:      Conjunctiva/sclera: Conjunctivae normal.   Cardiovascular:      Rate and Rhythm: Normal rate and regular rhythm.      Heart sounds: Normal heart sounds.   Pulmonary:      Effort: Pulmonary effort is normal.      Breath sounds: Normal breath sounds.   Musculoskeletal:      Cervical back: Normal range of motion and neck supple.   Skin:     General: Skin is warm and dry.   Neurological:      Mental Status: She is alert and oriented to person, place, and time.   Psychiatric:         Mood and Affect: Mood normal.         PHQ-9 Total Score:      Body mass index is 42.48 kg/m².       Assessment/Plan     Diagnoses and all orders for this visit:    1. Essential hypertension (Primary)  -     Comprehensive Metabolic Panel    2. Plantar fasciitis of right foot  -     Ambulatory Referral to Podiatry    3. Post-menopausal  -     Dexa Bone Density, Axial (Every 2 Years); Future    Podiatry referral.  Meds same.  Check electrolytes.  Significant right ureteral stone noted.  Urology notes noted.  Encourage vaccines.  Bone density study  requested.  Stay safely active maintain heart healthy diet.  Please monitor BP at home.  You state anxiety raises it here.  Recheck in about 6 months or as needed.                     This document has been electronically signed by Hank Marin MD   October 26, 2021 14:28 EDT    Part of this note may be an electronic transcription/translation of spoken language to printed text using the Dragon Dictation System.

## 2021-10-27 LAB
ALBUMIN SERPL-MCNC: 4.2 G/DL (ref 3.5–5.2)
ALBUMIN/GLOB SERPL: 1.8 G/DL
ALP SERPL-CCNC: 84 U/L (ref 39–117)
ALT SERPL-CCNC: 19 U/L (ref 1–33)
AST SERPL-CCNC: 15 U/L (ref 1–32)
BILIRUB SERPL-MCNC: 0.3 MG/DL (ref 0–1.2)
BUN SERPL-MCNC: 11 MG/DL (ref 8–23)
BUN/CREAT SERPL: 16.2 (ref 7–25)
CALCIUM SERPL-MCNC: 9.3 MG/DL (ref 8.6–10.5)
CHLORIDE SERPL-SCNC: 100 MMOL/L (ref 98–107)
CO2 SERPL-SCNC: 26.6 MMOL/L (ref 22–29)
CREAT SERPL-MCNC: 0.68 MG/DL (ref 0.57–1)
GLOBULIN SER CALC-MCNC: 2.3 GM/DL
GLUCOSE SERPL-MCNC: 165 MG/DL (ref 65–99)
POTASSIUM SERPL-SCNC: 4 MMOL/L (ref 3.5–5.2)
PROT SERPL-MCNC: 6.5 G/DL (ref 6–8.5)
SODIUM SERPL-SCNC: 135 MMOL/L (ref 136–145)

## 2021-10-29 ENCOUNTER — OFFICE VISIT (OUTPATIENT)
Dept: FAMILY MEDICINE CLINIC | Facility: CLINIC | Age: 61
End: 2021-10-29

## 2021-10-29 VITALS
WEIGHT: 247.4 LBS | TEMPERATURE: 97.5 F | HEIGHT: 64 IN | BODY MASS INDEX: 42.24 KG/M2 | DIASTOLIC BLOOD PRESSURE: 79 MMHG | SYSTOLIC BLOOD PRESSURE: 161 MMHG | HEART RATE: 81 BPM | OXYGEN SATURATION: 96 %

## 2021-10-29 DIAGNOSIS — J06.9 ACUTE URI: Primary | ICD-10-CM

## 2021-10-29 LAB
FLUAV SUBTYP SPEC NAA+PROBE: NOT DETECTED
FLUBV RNA ISLT QL NAA+PROBE: NOT DETECTED
SARS-COV-2 RNA PNL SPEC NAA+PROBE: NOT DETECTED

## 2021-10-29 PROCEDURE — 99213 OFFICE O/P EST LOW 20 MIN: CPT | Performed by: NURSE PRACTITIONER

## 2021-10-29 PROCEDURE — 87636 SARSCOV2 & INF A&B AMP PRB: CPT | Performed by: NURSE PRACTITIONER

## 2021-10-29 RX ORDER — AZITHROMYCIN 250 MG/1
TABLET, FILM COATED ORAL
Qty: 6 TABLET | Refills: 0 | Status: SHIPPED | OUTPATIENT
Start: 2021-10-29 | End: 2022-01-27

## 2021-10-29 NOTE — PROGRESS NOTES
"Subjective   Ana Lilia Leach is a 61 y.o. female.     Chief Complaint   Patient presents with   • Sinus Problem     Sinus pressure, sore throat, headache, and non productive cough.    Patient denies fever, diarrhea       She presents with c/o a tickle in her right nostril yesterday morning. It progressed into sneezing, runny nose, post nasal drip, sore throat. She denies fever. She c/o pain around her eyes. She denies loss of taste and smell. She has taken some tylenol. She has yellow drainage from her nose.        The following portions of the patient's history were reviewed and updated as appropriate: allergies, current medications, past family history, past medical history, past social history, past surgical history and problem list.    Review of Systems   Constitutional: Negative for fatigue, fever and unexpected weight change.   HENT: Positive for congestion, postnasal drip, rhinorrhea, sinus pressure, sinus pain, sneezing and sore throat. Negative for ear pain.    Eyes: Negative for visual disturbance.   Respiratory: Positive for cough. Negative for chest tightness, shortness of breath and wheezing.    Cardiovascular: Negative for chest pain, palpitations and leg swelling.   Gastrointestinal: Positive for nausea. Negative for abdominal pain, blood in stool, constipation, diarrhea and vomiting.   Endocrine: Negative for cold intolerance and heat intolerance.   Genitourinary: Negative for dysuria.   Musculoskeletal: Negative for arthralgias and myalgias.   Skin: Negative for color change.   Allergic/Immunologic: Negative for environmental allergies.   Neurological: Positive for headaches.   Hematological: Negative for adenopathy.   Psychiatric/Behavioral: Negative for suicidal ideas. The patient is not nervous/anxious.        Objective     /79 (BP Location: Left arm, Patient Position: Sitting, Cuff Size: Adult)   Pulse 81   Temp 97.5 °F (36.4 °C) (Temporal)   Ht 162.6 cm (64.02\")   Wt 112 kg (247 lb " 6.4 oz)   SpO2 96%   BMI 42.44 kg/m²     Physical Exam  Vitals reviewed.   Constitutional:       General: She is not in acute distress.     Appearance: Normal appearance. She is well-developed. She is not diaphoretic.   HENT:      Head: Normocephalic and atraumatic.      Right Ear: Hearing, tympanic membrane, ear canal and external ear normal.      Left Ear: Hearing, tympanic membrane, ear canal and external ear normal.      Nose: Nose normal.      Right Sinus: No maxillary sinus tenderness or frontal sinus tenderness.      Left Sinus: No maxillary sinus tenderness or frontal sinus tenderness.      Mouth/Throat:      Mouth: Mucous membranes are moist.      Pharynx: Uvula midline.   Eyes:      General: Lids are normal.      Conjunctiva/sclera: Conjunctivae normal.      Pupils: Pupils are equal, round, and reactive to light.   Neck:      Trachea: Trachea normal. No tracheal tenderness or tracheal deviation.   Cardiovascular:      Rate and Rhythm: Normal rate and regular rhythm.      Heart sounds: Normal heart sounds, S1 normal and S2 normal. No murmur heard.  No friction rub. No gallop.    Pulmonary:      Effort: Pulmonary effort is normal. No respiratory distress.      Breath sounds: Normal breath sounds.   Abdominal:      General: Bowel sounds are normal. There is no distension.      Palpations: Abdomen is soft.      Tenderness: There is no abdominal tenderness.   Lymphadenopathy:      Cervical: No cervical adenopathy.   Skin:     General: Skin is warm and dry.   Neurological:      Mental Status: She is alert and oriented to person, place, and time.   Psychiatric:         Behavior: Behavior normal.         Thought Content: Thought content normal.         Judgment: Judgment normal.         Assessment/Plan     Problem List Items Addressed This Visit     None      Visit Diagnoses     Acute URI    -  Primary    Relevant Medications    azithromycin (Zithromax Z-Nilson) 250 MG tablet    Other Relevant Orders    COVID-19  and FLU A/B PCR - Swab, Nasopharynx          Plan: Get covid screen. Self quarantine for now. Zpack ordered. Do not take unless Covid screen is negative and symptoms do not resolve in a couple days. We discussed monoclonal antibody infusion if she is covid positive. Follow up pending results.     @Body mass index is 42.44 kg/m².           Understands disease processes and need for medications.  Understands reasons for urgent and emergent care.  Patient (& family) verbalized agreement for treatment plan.   Emotional support and active listening provided.  Patient provided time to verbalize feelings.               This document has been electronically signed by MICHAEL Desai   October 29, 2021 15:24 EDT

## 2021-10-31 RX ORDER — ESCITALOPRAM OXALATE 10 MG/1
TABLET ORAL
Qty: 90 TABLET | Refills: 3 | Status: SHIPPED | OUTPATIENT
Start: 2021-10-31 | End: 2022-08-31 | Stop reason: SDUPTHER

## 2021-12-09 ENCOUNTER — OFFICE VISIT (OUTPATIENT)
Dept: UROLOGY | Facility: CLINIC | Age: 61
End: 2021-12-09

## 2021-12-09 ENCOUNTER — OFFICE VISIT (OUTPATIENT)
Dept: GASTROENTEROLOGY | Facility: CLINIC | Age: 61
End: 2021-12-09

## 2021-12-09 ENCOUNTER — APPOINTMENT (OUTPATIENT)
Dept: BONE DENSITY | Facility: HOSPITAL | Age: 61
End: 2021-12-09

## 2021-12-09 VITALS
SYSTOLIC BLOOD PRESSURE: 134 MMHG | WEIGHT: 247 LBS | BODY MASS INDEX: 42.17 KG/M2 | HEART RATE: 71 BPM | OXYGEN SATURATION: 97 % | DIASTOLIC BLOOD PRESSURE: 78 MMHG | HEIGHT: 64 IN

## 2021-12-09 VITALS — HEIGHT: 64 IN | WEIGHT: 247 LBS | BODY MASS INDEX: 42.17 KG/M2

## 2021-12-09 DIAGNOSIS — N20.0 RENAL STONES: Primary | ICD-10-CM

## 2021-12-09 DIAGNOSIS — R10.13 EPIGASTRIC PAIN: Primary | ICD-10-CM

## 2021-12-09 DIAGNOSIS — K21.00 GASTROESOPHAGEAL REFLUX DISEASE WITH ESOPHAGITIS WITHOUT HEMORRHAGE: ICD-10-CM

## 2021-12-09 PROCEDURE — 99213 OFFICE O/P EST LOW 20 MIN: CPT | Performed by: INTERNAL MEDICINE

## 2021-12-09 PROCEDURE — 99213 OFFICE O/P EST LOW 20 MIN: CPT | Performed by: UROLOGY

## 2021-12-09 NOTE — PROGRESS NOTES
Subjective   Ana Lilia Leach is a 61 y.o. female who presents to the office today as a follow up appointment regarding Heartburn      History of Present Illness:    The patient presents for follow-up epigastric abdominal pain.  She recently underwent EGD and colonoscopy.  The EGD did revealed reflux esophagitis.  Biopsies of the stomach were negative for H. pylori gastritis.  Several polyps were removed at the time of her colonoscopy which were adenomatous.  We will repeat her colonoscopy in 3 years.  Of note, there is no family history of colon cancer.  Recently, she had been walking almost daily and had  lost 10 lbs but eventually developed a bone spur and fascitis.  She was forced to stop walking due to this.  She has gained some back due to being immobile.  She is improved on Protonix but will get breakthrough reflux if she eats the wrong foods, such as, greasy foods peppermint or chocolate.  Overall, the patient is improved.      Review of Systems:  Review of Systems   Constitutional: Negative for fever.   HENT: Negative for trouble swallowing.    Eyes: Negative.    Respiratory: Negative for chest tightness.    Cardiovascular: Negative for chest pain.   Gastrointestinal: Negative for abdominal distention, abdominal pain, anal bleeding, blood in stool, constipation, diarrhea, nausea, rectal pain and vomiting.   Endocrine: Negative.    Genitourinary: Negative for difficulty urinating.   Musculoskeletal: Positive for back pain.   Skin: Negative.    Allergic/Immunologic: Negative for environmental allergies and food allergies.   Neurological: Positive for headaches.   Hematological: Does not bruise/bleed easily.   Psychiatric/Behavioral: Negative.        Past Medical History:  Past Medical History:   Diagnosis Date   • Anxiety    • Arthritis    • GERD (gastroesophageal reflux disease)    • Headache    • Hypertension    • Kidney stone    • PONV (postoperative nausea and vomiting)    • PVC's (premature ventricular  contractions)    • Thyroid disease     NODULES       Past Surgical History:  Past Surgical History:   Procedure Laterality Date   • COLONOSCOPY     • COLONOSCOPY N/A 2021    Procedure: COLONOSCOPY FOR SCREENING;  Surgeon: Carmel Lang MD;  Location: [facility];  Service: Gastroenterology;  Laterality: N/A;   • ENDOSCOPY     • ENDOSCOPY N/A 2021    Procedure: ESOPHAGOGASTRODUODENOSCOPY WITH BIOPSY;  Surgeon: Carmel Lang MD;  Location: [facility];  Service: Gastroenterology;  Laterality: N/A;   • GALLBLADDER SURGERY     • OVARY SURGERY Right    • TUBAL ABDOMINAL LIGATION         Family History:  Family History   Problem Relation Age of Onset   • Arthritis Mother    • Diabetes Mother    • Cancer Mother    • Hypertension Mother    • Cancer Sister    • Diabetes Son    • Breast cancer Neg Hx        Social History:  Social History     Socioeconomic History   • Marital status:    Tobacco Use   • Smoking status: Former Smoker     Packs/day: 1.50     Years: 15.00     Pack years: 22.50     Types: Cigarettes     Quit date: 2009     Years since quittin.8   • Smokeless tobacco: Never Used   Vaping Use   • Vaping Use: Never used   Substance and Sexual Activity   • Alcohol use: Never   • Drug use: Never   • Sexual activity: Not Currently     Partners: Female, Male       Current Medication List:    Current Outpatient Medications:   •  escitalopram (LEXAPRO) 10 MG tablet, TAKE 1 TABLET EVERY DAY, Disp: 90 tablet, Rfl: 3  •  nadolol (CORGARD) 40 MG tablet, Take 1 tablet by mouth Daily., Disp: 90 tablet, Rfl: 3  •  pantoprazole (PROTONIX) 40 MG EC tablet, Take 1 tablet by mouth Daily., Disp: 90 tablet, Rfl: 3  •  azithromycin (Zithromax Z-Nilson) 250 MG tablet, Take 2 tablets the first day, then 1 tablet daily for 4 days., Disp: 6 tablet, Rfl: 0    Allergies:   Penicillins, Flagyl [metronidazole], Levaquin [levofloxacin], and Aspirin    Vitals:  /78   Pulse 71   Ht 162.6 cm  "(64\")   Wt 112 kg (247 lb)   SpO2 97%   BMI 42.40 kg/m²     Physical Exam:  Physical Exam  Constitutional:       Appearance: She is obese.   HENT:      Head: Normocephalic and atraumatic.      Nose: Nose normal. No congestion or rhinorrhea.   Eyes:      General: No scleral icterus.     Extraocular Movements: Extraocular movements intact.      Conjunctiva/sclera: Conjunctivae normal.      Pupils: Pupils are equal, round, and reactive to light.   Cardiovascular:      Rate and Rhythm: Normal rate and regular rhythm.      Pulses: Normal pulses.      Heart sounds: Normal heart sounds.   Pulmonary:      Effort: Pulmonary effort is normal.      Breath sounds: Normal breath sounds.   Abdominal:      General: Abdomen is flat. Bowel sounds are normal. There is no distension.      Palpations: Abdomen is soft. There is no shifting dullness, fluid wave, hepatomegaly, splenomegaly, mass or pulsatile mass.      Tenderness: There is no abdominal tenderness. There is no guarding or rebound.      Hernia: No hernia is present.   Musculoskeletal:         General: No swelling or tenderness.      Cervical back: Normal range of motion and neck supple.   Skin:     General: Skin is warm and dry.      Coloration: Skin is not jaundiced.   Neurological:      General: No focal deficit present.      Mental Status: She is alert and oriented to person, place, and time.   Psychiatric:         Mood and Affect: Mood normal.         Behavior: Behavior normal.         Results Review:  Lab Results:   No visits with results within 1 Month(s) from this visit.   Latest known visit with results is:   Office Visit on 10/29/2021   Component Date Value Ref Range Status   • COVID19 10/29/2021 Not Detected  Not Detected - Ref. Range Final   • Influenza A PCR 10/29/2021 Not Detected  Not Detected Final   • Influenza B PCR 10/29/2021 Not Detected  Not Detected Final       Assessment/Plan     Visit Diagnoses:    ICD-10-CM ICD-9-CM   1. Epigastric pain  R10.13 " 789.06   2. Gastroesophageal reflux disease with esophagitis without hemorrhage  K21.00 530.81     530.10       Plan:  The patient is doing quite well on Protonix 40 mg once daily.  She will follow up as needed.  If her symptoms worsen, she will return for follow-up.    * Surgery not found *      MEDS ORDERED DURING VISIT:  No orders of the defined types were placed in this encounter.      Return in about 3 months (around 3/9/2022).             This document has been electronically signed by Carmel Lang MD   December 9, 2021 14:33 EST      Part of this note may be an electronic transcription/translation of spoken language to printed text using the Dragon Dictation System.

## 2021-12-13 NOTE — PROGRESS NOTES
Chief Complaint:          Chief Complaint   Patient presents with   • Follow-up     kidney stones        HPI:   61 y.o. female returns today previously a patient of Mateus Quevedo's. 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 I Karina get the nuclear medicine scan she is asymptomatic completely. She has a 1.22 cm distal stone in the face of a nonfunctioning kidney she is asymptomatic so surgical intervention would be fruitless.      Past Medical History:        Past Medical History:   Diagnosis Date   • Anxiety    • Arthritis    • GERD (gastroesophageal reflux disease)    • Headache    • Hypertension    • Kidney stone    • PONV (postoperative nausea and vomiting)    • PVC's (premature ventricular contractions)    • Thyroid disease     NODULES         Current Meds:     Current Outpatient Medications   Medication Sig Dispense Refill   • azithromycin (Zithromax Z-Nilson) 250 MG tablet Take 2 tablets the first day, then 1 tablet daily for 4 days. 6 tablet 0   • escitalopram (LEXAPRO) 10 MG tablet TAKE 1 TABLET EVERY DAY 90 tablet 3   • nadolol (CORGARD) 40 MG tablet Take 1 tablet by mouth Daily. 90 tablet 3   • pantoprazole (PROTONIX) 40 MG EC tablet Take 1 tablet by mouth Daily. 90 tablet 3     No current facility-administered medications for this visit.        Allergies:      Allergies   Allergen Reactions   • Penicillins Anaphylaxis   • Flagyl [Metronidazole] Hives   • Levaquin [Levofloxacin] Hallucinations   • Aspirin Anxiety        Past Surgical History:     Past Surgical History:   Procedure Laterality Date   • COLONOSCOPY     • COLONOSCOPY N/A 5/19/2021    Procedure: COLONOSCOPY FOR SCREENING;  Surgeon: Carmel Lang MD;  Location: [facility];  Service: Gastroenterology;  Laterality: N/A;   • ENDOSCOPY     • ENDOSCOPY N/A 5/19/2021    Procedure: ESOPHAGOGASTRODUODENOSCOPY WITH BIOPSY;  Surgeon:  Carmel Lang MD;  Location: [facility];  Service: Gastroenterology;  Laterality: N/A;   • GALLBLADDER SURGERY     • OVARY SURGERY Right    • TUBAL ABDOMINAL LIGATION           Social History:     Social History     Socioeconomic History   • Marital status:    Tobacco Use   • Smoking status: Former Smoker     Packs/day: 1.50     Years: 15.00     Pack years: 22.50     Types: Cigarettes     Quit date: 2009     Years since quittin.8   • Smokeless tobacco: Never Used   Vaping Use   • Vaping Use: Never used   Substance and Sexual Activity   • Alcohol use: Never   • Drug use: Never   • Sexual activity: Not Currently     Partners: Female, Male       Family History:     Family History   Problem Relation Age of Onset   • Arthritis Mother    • Diabetes Mother    • Cancer Mother    • Hypertension Mother    • Cancer Sister    • Diabetes Son    • Breast cancer Neg Hx        Review of Systems:     Review of Systems   Constitutional: Negative.  Negative for activity change, appetite change, chills, diaphoresis, fatigue and unexpected weight change.   HENT: Negative for congestion, dental problem, drooling, ear discharge, ear pain, facial swelling, hearing loss, mouth sores, nosebleeds, postnasal drip, rhinorrhea, sinus pressure, sneezing, sore throat, tinnitus, trouble swallowing and voice change.    Eyes: Negative.  Negative for photophobia, pain, discharge, redness, itching and visual disturbance.   Respiratory: Negative.  Negative for apnea, cough, choking, chest tightness, shortness of breath, wheezing and stridor.    Cardiovascular: Negative.  Negative for chest pain, palpitations and leg swelling.   Gastrointestinal: Negative.  Negative for abdominal distention, abdominal pain, anal bleeding, blood in stool, constipation, diarrhea, nausea, rectal pain and vomiting.   Endocrine: Negative.  Negative for cold intolerance, heat intolerance, polydipsia, polyphagia and polyuria.   Musculoskeletal:  Negative.  Negative for arthralgias, back pain, gait problem, joint swelling, myalgias, neck pain and neck stiffness.   Skin: Negative.  Negative for color change, pallor, rash and wound.   Allergic/Immunologic: Negative.  Negative for environmental allergies, food allergies and immunocompromised state.   Neurological: Negative.  Negative for dizziness, tremors, seizures, syncope, facial asymmetry, speech difficulty, weakness, light-headedness, numbness and headaches.   Hematological: Negative.  Negative for adenopathy. Does not bruise/bleed easily.   Psychiatric/Behavioral: Negative for agitation, behavioral problems, confusion, decreased concentration, dysphoric mood, hallucinations, self-injury, sleep disturbance and suicidal ideas. The patient is not nervous/anxious and is not hyperactive.    All other systems reviewed and are negative.      Physical Exam:     Physical Exam  Constitutional:       Appearance: She is well-developed.   HENT:      Head: Normocephalic and atraumatic.      Right Ear: External ear normal.      Left Ear: External ear normal.   Eyes:      Conjunctiva/sclera: Conjunctivae normal.      Pupils: Pupils are equal, round, and reactive to light.   Cardiovascular:      Rate and Rhythm: Normal rate and regular rhythm.      Heart sounds: Normal heart sounds.   Pulmonary:      Effort: Pulmonary effort is normal.      Breath sounds: Normal breath sounds.   Abdominal:      General: Bowel sounds are normal. There is no distension.      Palpations: Abdomen is soft. There is no mass.      Tenderness: There is no abdominal tenderness. There is no guarding or rebound.   Genitourinary:     Vagina: No vaginal discharge.   Musculoskeletal:         General: Normal range of motion.   Skin:     General: Skin is warm and dry.   Neurological:      Mental Status: She is alert.      Deep Tendon Reflexes: Reflexes are normal and symmetric.   Psychiatric:         Behavior: Behavior normal.         Thought Content:  Thought content normal.         Judgment: Judgment normal.         I have reviewed the following portions of the patient's history: Allergies, current medications, past family history, past medical history, past social history, past surgical history, problem list, and ROS and confirm it is accurate.      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

## 2022-01-04 ENCOUNTER — APPOINTMENT (OUTPATIENT)
Dept: NUCLEAR MEDICINE | Facility: HOSPITAL | Age: 62
End: 2022-01-04

## 2022-01-13 ENCOUNTER — APPOINTMENT (OUTPATIENT)
Dept: NUCLEAR MEDICINE | Facility: HOSPITAL | Age: 62
End: 2022-01-13

## 2022-01-27 ENCOUNTER — TELEPHONE (OUTPATIENT)
Dept: FAMILY MEDICINE CLINIC | Facility: CLINIC | Age: 62
End: 2022-01-27

## 2022-01-27 ENCOUNTER — OFFICE VISIT (OUTPATIENT)
Dept: FAMILY MEDICINE CLINIC | Facility: CLINIC | Age: 62
End: 2022-01-27

## 2022-01-27 VITALS
BODY MASS INDEX: 40.63 KG/M2 | SYSTOLIC BLOOD PRESSURE: 130 MMHG | HEART RATE: 78 BPM | DIASTOLIC BLOOD PRESSURE: 71 MMHG | HEIGHT: 64 IN | TEMPERATURE: 97.3 F | WEIGHT: 238 LBS | OXYGEN SATURATION: 96 % | RESPIRATION RATE: 18 BRPM

## 2022-01-27 DIAGNOSIS — J06.9 UPPER RESPIRATORY TRACT INFECTION, UNSPECIFIED TYPE: ICD-10-CM

## 2022-01-27 DIAGNOSIS — R30.0 DYSURIA: Primary | ICD-10-CM

## 2022-01-27 LAB
B PARAPERT DNA SPEC QL NAA+PROBE: NOT DETECTED
B PERT DNA SPEC QL NAA+PROBE: NOT DETECTED
BILIRUB BLD-MCNC: NEGATIVE MG/DL
C PNEUM DNA NPH QL NAA+NON-PROBE: NOT DETECTED
CLARITY, POC: CLEAR
COLOR UR: YELLOW
EXPIRATION DATE: ABNORMAL
FLUAV SUBTYP SPEC NAA+PROBE: NOT DETECTED
FLUBV RNA ISLT QL NAA+PROBE: NOT DETECTED
GLUCOSE UR STRIP-MCNC: NEGATIVE MG/DL
HADV DNA SPEC NAA+PROBE: NOT DETECTED
HCOV 229E RNA SPEC QL NAA+PROBE: NOT DETECTED
HCOV HKU1 RNA SPEC QL NAA+PROBE: NOT DETECTED
HCOV NL63 RNA SPEC QL NAA+PROBE: NOT DETECTED
HCOV OC43 RNA SPEC QL NAA+PROBE: NOT DETECTED
HMPV RNA NPH QL NAA+NON-PROBE: NOT DETECTED
HPIV1 RNA ISLT QL NAA+PROBE: NOT DETECTED
HPIV2 RNA SPEC QL NAA+PROBE: NOT DETECTED
HPIV3 RNA NPH QL NAA+PROBE: NOT DETECTED
HPIV4 P GENE NPH QL NAA+PROBE: NOT DETECTED
KETONES UR QL: NEGATIVE
LEUKOCYTE EST, POC: NEGATIVE
Lab: ABNORMAL
M PNEUMO IGG SER IA-ACNC: NOT DETECTED
NITRITE UR-MCNC: NEGATIVE MG/ML
PH UR: 6 [PH] (ref 5–8)
PROT UR STRIP-MCNC: ABNORMAL MG/DL
RBC # UR STRIP: ABNORMAL /UL
RHINOVIRUS RNA SPEC NAA+PROBE: NOT DETECTED
RSV RNA NPH QL NAA+NON-PROBE: NOT DETECTED
SARS-COV-2 RNA NPH QL NAA+NON-PROBE: DETECTED
SP GR UR: 1.03 (ref 1–1.03)
UROBILINOGEN UR QL: NORMAL

## 2022-01-27 PROCEDURE — 0202U NFCT DS 22 TRGT SARS-COV-2: CPT | Performed by: NURSE PRACTITIONER

## 2022-01-27 PROCEDURE — 81003 URINALYSIS AUTO W/O SCOPE: CPT | Performed by: NURSE PRACTITIONER

## 2022-01-27 PROCEDURE — 99213 OFFICE O/P EST LOW 20 MIN: CPT | Performed by: NURSE PRACTITIONER

## 2022-01-27 PROCEDURE — 87086 URINE CULTURE/COLONY COUNT: CPT | Performed by: NURSE PRACTITIONER

## 2022-01-27 RX ORDER — AZITHROMYCIN 250 MG/1
TABLET, FILM COATED ORAL
Qty: 6 TABLET | Refills: 0 | Status: SHIPPED | OUTPATIENT
Start: 2022-01-27 | End: 2022-07-18

## 2022-01-27 NOTE — PROGRESS NOTES
"Subjective   Ana Lilia Leach is a 61 y.o. female.     Chief Complaint   Patient presents with   • URI       She presents with c/o Fever 2 weeks ago Saturday. She c/o Body aches, Bladder pain,  Loss of taste and smell,  Cough with yellow phlegm, white and frothy. She c/o feeling like her lungs are hurting. She has been taking tylenol.        The following portions of the patient's history were reviewed and updated as appropriate: allergies, current medications, past family history, past medical history, past social history, past surgical history and problem list.    Review of Systems   Constitutional: Positive for fatigue and fever. Negative for unexpected weight change.   HENT: Positive for congestion, postnasal drip and sore throat. Negative for ear pain and rhinorrhea.    Eyes: Negative for visual disturbance.   Respiratory: Positive for cough, chest tightness, shortness of breath and wheezing.    Cardiovascular: Negative for chest pain, palpitations and leg swelling.   Gastrointestinal: Positive for diarrhea and nausea. Negative for abdominal pain, blood in stool, constipation and vomiting.   Endocrine: Negative for cold intolerance and heat intolerance.   Genitourinary: Positive for dysuria. Negative for hematuria.   Musculoskeletal: Positive for arthralgias and back pain. Negative for myalgias.   Skin: Negative for color change.   Allergic/Immunologic: Negative for environmental allergies.   Neurological: Positive for headaches. Negative for dizziness.   Hematological: Negative for adenopathy.   Psychiatric/Behavioral: Negative for suicidal ideas. The patient is not nervous/anxious.        Objective     /71 (BP Location: Left arm, Patient Position: Sitting, Cuff Size: Large Adult)   Pulse 78   Temp 97.3 °F (36.3 °C) (Temporal)   Resp 18   Ht 162.6 cm (64.02\")   Wt 108 kg (238 lb)   SpO2 96%   BMI 40.83 kg/m²     Physical Exam  Vitals reviewed.   Constitutional:       General: She is not in acute " distress.     Appearance: Normal appearance. She is well-developed. She is not diaphoretic.   HENT:      Head: Normocephalic and atraumatic.      Right Ear: Hearing, tympanic membrane, ear canal and external ear normal.      Left Ear: Hearing, tympanic membrane, ear canal and external ear normal.      Nose: Nose normal.      Right Sinus: No maxillary sinus tenderness or frontal sinus tenderness.      Left Sinus: No maxillary sinus tenderness or frontal sinus tenderness.      Mouth/Throat:      Pharynx: Uvula midline.   Eyes:      General: Lids are normal.      Conjunctiva/sclera: Conjunctivae normal.   Neck:      Trachea: Trachea normal. No tracheal tenderness or tracheal deviation.   Cardiovascular:      Rate and Rhythm: Normal rate and regular rhythm.      Heart sounds: Normal heart sounds, S1 normal and S2 normal. No murmur heard.  No friction rub. No gallop.    Pulmonary:      Effort: Pulmonary effort is normal. No respiratory distress.      Breath sounds: Normal breath sounds.   Abdominal:      General: Bowel sounds are normal. There is no distension.      Palpations: Abdomen is soft.      Tenderness: There is no abdominal tenderness.   Lymphadenopathy:      Cervical: No cervical adenopathy.   Skin:     General: Skin is warm and dry.   Neurological:      Mental Status: She is alert and oriented to person, place, and time.   Psychiatric:         Behavior: Behavior normal.         Thought Content: Thought content normal.         Judgment: Judgment normal.         Assessment/Plan     Problem List Items Addressed This Visit     None          Plan: Get covid swab. You are already past quarantine time. Zpack ordered for bronchitis. Go to ER with shortness of breath. Urinalysis has blood and protein, send for culture. She does have a kidney stone in her ureter, this could be causing the blood and pressure. Follow up pending results.     @Body mass index is 40.83 kg/m².                Understands disease processes and  need for medications.  Understands reasons for urgent and emergent care.  Patient (& family) verbalized agreement for treatment plan.   Emotional support and active listening provided.  Patient provided time to verbalize feelings.               This document has been electronically signed by MICHAEL Desai   January 27, 2022 10:07 EST  Answers for HPI/ROS submitted by the patient on 1/27/2022  Please describe your symptoms.: Onset of symptoms almost two weeks ago. Itchy, dry throat and cough along with a very bad headaches for a few days. Severe sinus irritation and sneezing, fatigue, weakness, low grade fever (never got to 101), loss of taste and smell, severe back pain and ongoing diarrhea. Some of these symptoms have gotten better, some have not.  Have you had these symptoms before?: Yes  How long have you been having these symptoms?: 1-2 weeks  Please list any medications you are currently taking for this condition.: Acetaminophen.  Please describe any probable cause for these symptoms. : Not sure.  What is the primary reason for your visit?: Other

## 2022-01-27 NOTE — TELEPHONE ENCOUNTER
Caller: Ana Lilia Leach    Relationship: Self    Best call back number: [phone]    Caller requesting test results: YES    What test was performed: COVID SWAB    When was the test performed: 1/27/22    Where was the test performed: OFFICE    Additional notes: PATIENT STATED SHE WAS TOLD SHE WOULD RECEIVE RESULTS AFTER LUNCHTIME.

## 2022-01-28 ENCOUNTER — TELEPHONE (OUTPATIENT)
Dept: FAMILY MEDICINE CLINIC | Facility: CLINIC | Age: 62
End: 2022-01-28

## 2022-01-28 NOTE — TELEPHONE ENCOUNTER
Caller: Ana Lilia Leach    Relationship: Self    Best call back number:    [phone]    Caller requesting test results:     PATIENT    What test was performed:     URINALYSIS    When was the test performed:     YESTERDAY, 1/27/22    Where was the test performed:     OFFICE    Additional notes:     PATIENT STATED ALLEN GARCIA NOTED THERE WAS BLOOD IN PATIENT'S URINE AND WAS GOING TO SEND OUT FOR A CULTURE    PATIENT RECEIVED RESULTS IN HER MYCHART    PATIENT ASKED IF THE RESULTS ARE FROM THE FIRST URINALYSIS OR FROM THE CULTURE     PLEASE CONTACT PATIENT WITH RESPONSE TO HER QUESTION    ALLEN GARCIA

## 2022-01-29 LAB
BACTERIA UR CULT: NORMAL
BACTERIA UR CULT: NORMAL

## 2022-02-03 ENCOUNTER — APPOINTMENT (OUTPATIENT)
Dept: NUCLEAR MEDICINE | Facility: HOSPITAL | Age: 62
End: 2022-02-03

## 2022-02-07 DIAGNOSIS — N20.0 RENAL STONES: Primary | ICD-10-CM

## 2022-02-07 RX ORDER — LORAZEPAM 1 MG/1
1 TABLET ORAL TAKE AS DIRECTED
Qty: 2 TABLET | Refills: 0 | Status: SHIPPED | OUTPATIENT
Start: 2022-02-07

## 2022-02-08 ENCOUNTER — HOSPITAL ENCOUNTER (OUTPATIENT)
Dept: NUCLEAR MEDICINE | Facility: HOSPITAL | Age: 62
Discharge: HOME | End: 2022-02-08

## 2022-02-08 DIAGNOSIS — N20.0 RENAL STONES: ICD-10-CM

## 2022-02-08 PROCEDURE — A9562 TC99M MERTIATIDE: HCPCS | Performed by: UROLOGY

## 2022-02-08 PROCEDURE — 78708 K FLOW/FUNCT IMAGE W/DRUG: CPT

## 2022-02-08 PROCEDURE — 0 TECHNETIUM MERTIATIDE: Performed by: UROLOGY

## 2022-02-08 PROCEDURE — 78708 K FLOW/FUNCT IMAGE W/DRUG: CPT | Performed by: RADIOLOGY

## 2022-02-08 PROCEDURE — 25010000002 FUROSEMIDE PER 20 MG: Performed by: UROLOGY

## 2022-02-08 RX ORDER — FUROSEMIDE 10 MG/ML
0.3 INJECTION INTRAMUSCULAR; INTRAVENOUS
Status: COMPLETED | OUTPATIENT
Start: 2022-02-08 | End: 2022-02-08

## 2022-02-08 RX ADMIN — FUROSEMIDE 32.4 MG: 10 INJECTION, SOLUTION INTRAMUSCULAR; INTRAVENOUS at 14:47

## 2022-02-08 RX ADMIN — TECHNESCAN TC 99M MERTIATIDE 1 DOSE: 1 INJECTION, POWDER, LYOPHILIZED, FOR SOLUTION INTRAVENOUS at 14:37

## 2022-03-07 RX ORDER — NADOLOL 40 MG/1
TABLET ORAL
Qty: 90 TABLET | Refills: 3 | Status: SHIPPED | OUTPATIENT
Start: 2022-03-07 | End: 2023-03-27 | Stop reason: SDUPTHER

## 2022-05-31 ENCOUNTER — APPOINTMENT (OUTPATIENT)
Dept: MAMMOGRAPHY | Facility: HOSPITAL | Age: 62
End: 2022-05-31

## 2022-07-18 ENCOUNTER — TELEMEDICINE (OUTPATIENT)
Dept: FAMILY MEDICINE CLINIC | Facility: CLINIC | Age: 62
End: 2022-07-18

## 2022-07-18 DIAGNOSIS — Z13.29 SCREENING FOR THYROID DISORDER: ICD-10-CM

## 2022-07-18 DIAGNOSIS — F41.9 CHRONIC ANXIETY: Chronic | ICD-10-CM

## 2022-07-18 DIAGNOSIS — I10 ESSENTIAL HYPERTENSION: Primary | Chronic | ICD-10-CM

## 2022-07-18 DIAGNOSIS — Z12.31 SCREENING MAMMOGRAM FOR BREAST CANCER: ICD-10-CM

## 2022-07-18 PROCEDURE — 99214 OFFICE O/P EST MOD 30 MIN: CPT | Performed by: NURSE PRACTITIONER

## 2022-07-18 NOTE — PROGRESS NOTES
Subjective  Answers for HPI/ROS submitted by the patient on 7/18/2022  Please describe your symptoms.: Normal check-up for high blood pressure, anxiety disorder, vertigo, blood work, etc..  Have you had these symptoms before?: Yes  How long have you been having these symptoms?: 1-4 days  Please list any medications you are currently taking for this condition.: Nadolol, Lexapro  What is the primary reason for your visit?: Latrice Leach is a 62 y.o. female.     Chief Complaint   Patient presents with   • Hypertension       She presents via video visit changed to telephone visit due to technical difficulties for f/u of essential hypertension and anxiety. She reports good blood pressure control. She has a blood pressure monitor. She takes her medication as needed. She states her anxiety seems to be balanced with the lexapro. She states she has a son with autism at home that worries her.       The following portions of the patient's history were reviewed and updated as appropriate: allergies, current medications, past family history, past medical history, past social history, past surgical history and problem list.    Review of Systems   Constitutional: Negative for fatigue, fever and unexpected weight change.   HENT: Negative for ear pain, rhinorrhea and sore throat.    Eyes: Negative for visual disturbance.   Respiratory: Negative for cough, chest tightness and shortness of breath.    Cardiovascular: Negative for chest pain, palpitations and leg swelling.   Gastrointestinal: Negative for abdominal pain, blood in stool, constipation, diarrhea, nausea and vomiting.   Endocrine: Negative for cold intolerance and heat intolerance.   Genitourinary: Negative for dysuria.   Musculoskeletal: Negative for arthralgias and myalgias.   Skin: Negative for color change.   Allergic/Immunologic: Negative for environmental allergies.   Hematological: Negative for adenopathy.   Psychiatric/Behavioral: Negative for suicidal  ideas. The patient is nervous/anxious.        Objective     There were no vitals taken for this visit.    Physical Exam  Constitutional:       General: She is not in acute distress.  Pulmonary:      Effort: Pulmonary effort is normal. No respiratory distress.   Neurological:      General: No focal deficit present.      Mental Status: She is alert and oriented to person, place, and time.   Psychiatric:         Mood and Affect: Mood normal.         Behavior: Behavior normal.         Current Outpatient Medications   Medication Sig Dispense Refill   • azithromycin (Zithromax Z-Nilson) 250 MG tablet Take 2 tablets the first day, then 1 tablet daily for 4 days. 6 tablet 0   • escitalopram (LEXAPRO) 10 MG tablet TAKE 1 TABLET EVERY DAY 90 tablet 3   • LORazepam (ATIVAN) 1 MG tablet Take 1 tablet by mouth Take As Directed. 2 tablet 0   • nadolol (CORGARD) 40 MG tablet TAKE 1 TABLET EVERY DAY 90 tablet 3   • pantoprazole (PROTONIX) 40 MG EC tablet Take 1 tablet by mouth Daily. 90 tablet 3     No current facility-administered medications for this visit.            Assessment & Plan     Problem List Items Addressed This Visit        Cardiac and Vasculature    Essential hypertension - Primary (Chronic)    Relevant Orders    CBC & Differential    Comprehensive Metabolic Panel    Lipid Panel       Mental Health    Chronic anxiety (Chronic)      Other Visit Diagnoses     Screening for thyroid disorder        Relevant Orders    TSH    Screening mammogram for breast cancer        Relevant Orders    Mammo Screening Digital Tomosynthesis Bilateral With CAD            ICD-10-CM ICD-9-CM   1. Essential hypertension  I10 401.9   2. Chronic anxiety  F41.9 300.00   3. Screening for thyroid disorder  Z13.29 V77.0   4. Screening mammogram for breast cancer  Z12.31 V76.12       Plan: Continue current medications. Get labs. Mammogram ordered. Discussed scheduling pap smear. Follow up in 6 months and as needed.     @There is no height or weight on  file to calculate BMI.         Understands disease processes and need for medications.  Understands reasons for urgent and emergent care.  Patient (& family) verbalized agreement for treatment plan.   Emotional support and active listening provided.  Patient provided time to verbalize feelings.           Class 3 Severe Obesity (BMI >=40). Obesity-related health conditions include the following: hypertension. Obesity is unchanged. BMI is is above average; BMI management plan is completed. We discussed increasing exercise.      This document has been electronically signed by MICHAEL Desai   July 18, 2022 15:16 EDT

## 2022-08-04 ENCOUNTER — APPOINTMENT (OUTPATIENT)
Dept: MAMMOGRAPHY | Facility: HOSPITAL | Age: 62
End: 2022-08-04

## 2022-08-31 RX ORDER — ESCITALOPRAM OXALATE 10 MG/1
10 TABLET ORAL DAILY
Qty: 90 TABLET | Refills: 0 | Status: SHIPPED | OUTPATIENT
Start: 2022-08-31 | End: 2022-10-04 | Stop reason: SDUPTHER

## 2022-09-08 ENCOUNTER — CLINICAL SUPPORT (OUTPATIENT)
Dept: FAMILY MEDICINE CLINIC | Facility: CLINIC | Age: 62
End: 2022-09-08

## 2022-09-08 DIAGNOSIS — I10 ESSENTIAL HYPERTENSION: ICD-10-CM

## 2022-09-08 DIAGNOSIS — E04.1 THYROID NODULE: Primary | ICD-10-CM

## 2022-09-08 PROCEDURE — 36415 COLL VENOUS BLD VENIPUNCTURE: CPT | Performed by: NURSE PRACTITIONER

## 2022-09-08 NOTE — PROGRESS NOTES
Venipuncture Blood Specimen Collection  Venipuncture performed in left arm by Lauren Bagley MA with good hemostasis. Patient tolerated the procedure well without complications.   09/08/22   Lauren Bagley MA

## 2022-09-09 LAB
ALBUMIN SERPL-MCNC: 4.2 G/DL (ref 3.5–5.2)
ALBUMIN/GLOB SERPL: 1.9 G/DL
ALP SERPL-CCNC: 71 U/L (ref 39–117)
ALT SERPL-CCNC: 16 U/L (ref 1–33)
AST SERPL-CCNC: 11 U/L (ref 1–32)
BASOPHILS # BLD AUTO: 0.07 10*3/MM3 (ref 0–0.2)
BASOPHILS NFR BLD AUTO: 0.7 % (ref 0–1.5)
BILIRUB SERPL-MCNC: 0.4 MG/DL (ref 0–1.2)
BUN SERPL-MCNC: 12 MG/DL (ref 8–23)
BUN/CREAT SERPL: 18.2 (ref 7–25)
CALCIUM SERPL-MCNC: 9.4 MG/DL (ref 8.6–10.5)
CHLORIDE SERPL-SCNC: 101 MMOL/L (ref 98–107)
CHOLEST SERPL-MCNC: 194 MG/DL (ref 0–200)
CO2 SERPL-SCNC: 27.8 MMOL/L (ref 22–29)
CREAT SERPL-MCNC: 0.66 MG/DL (ref 0.57–1)
EOSINOPHIL # BLD AUTO: 0.27 10*3/MM3 (ref 0–0.4)
EOSINOPHIL NFR BLD AUTO: 2.9 % (ref 0.3–6.2)
ERYTHROCYTE [DISTWIDTH] IN BLOOD BY AUTOMATED COUNT: 12.6 % (ref 12.3–15.4)
GLOBULIN SER CALC-MCNC: 2.2 GM/DL
GLUCOSE SERPL-MCNC: 177 MG/DL (ref 65–99)
HCT VFR BLD AUTO: 42.6 % (ref 34–46.6)
HDLC SERPL-MCNC: 37 MG/DL (ref 40–60)
HGB BLD-MCNC: 13.7 G/DL (ref 12–15.9)
IMM GRANULOCYTES # BLD AUTO: 0.03 10*3/MM3 (ref 0–0.05)
IMM GRANULOCYTES NFR BLD AUTO: 0.3 % (ref 0–0.5)
LDLC SERPL CALC-MCNC: 103 MG/DL (ref 0–100)
LYMPHOCYTES # BLD AUTO: 2.27 10*3/MM3 (ref 0.7–3.1)
LYMPHOCYTES NFR BLD AUTO: 24.1 % (ref 19.6–45.3)
MCH RBC QN AUTO: 28.1 PG (ref 26.6–33)
MCHC RBC AUTO-ENTMCNC: 32.2 G/DL (ref 31.5–35.7)
MCV RBC AUTO: 87.5 FL (ref 79–97)
MONOCYTES # BLD AUTO: 0.55 10*3/MM3 (ref 0.1–0.9)
MONOCYTES NFR BLD AUTO: 5.8 % (ref 5–12)
NEUTROPHILS # BLD AUTO: 6.23 10*3/MM3 (ref 1.7–7)
NEUTROPHILS NFR BLD AUTO: 66.2 % (ref 42.7–76)
NRBC BLD AUTO-RTO: 0 /100 WBC (ref 0–0.2)
PLATELET # BLD AUTO: 215 10*3/MM3 (ref 140–450)
POTASSIUM SERPL-SCNC: 4.6 MMOL/L (ref 3.5–5.2)
PROT SERPL-MCNC: 6.4 G/DL (ref 6–8.5)
RBC # BLD AUTO: 4.87 10*6/MM3 (ref 3.77–5.28)
SODIUM SERPL-SCNC: 138 MMOL/L (ref 136–145)
TRIGL SERPL-MCNC: 320 MG/DL (ref 0–150)
TSH SERPL DL<=0.005 MIU/L-ACNC: 1.07 UIU/ML (ref 0.27–4.2)
VLDLC SERPL CALC-MCNC: 54 MG/DL (ref 5–40)
WBC # BLD AUTO: 9.42 10*3/MM3 (ref 3.4–10.8)

## 2022-09-09 NOTE — PROGRESS NOTES
Triglycerides are elevated. Cut back on bread, carbs, pasta. Try fish oil supplement. Blood sugar is elevated. I recommend an A1C to check for diabetes.

## 2022-09-14 ENCOUNTER — CLINICAL SUPPORT (OUTPATIENT)
Dept: FAMILY MEDICINE CLINIC | Facility: CLINIC | Age: 62
End: 2022-09-14

## 2022-09-14 DIAGNOSIS — R73.9 ELEVATED BLOOD SUGAR: Primary | ICD-10-CM

## 2022-09-14 LAB — GLUCOSE BLDC GLUCOMTR-MCNC: 153 MG/DL (ref 70–130)

## 2022-09-14 PROCEDURE — 36415 COLL VENOUS BLD VENIPUNCTURE: CPT | Performed by: NURSE PRACTITIONER

## 2022-09-14 PROCEDURE — 82962 GLUCOSE BLOOD TEST: CPT | Performed by: NURSE PRACTITIONER

## 2022-09-14 NOTE — PROGRESS NOTES
Venipuncture Blood Specimen Collection  Venipuncture performed in LEFT ARM by Susu Gonzlaez MA with good hemostasis. Patient tolerated the procedure well without complications.   09/14/22   Susu Gonzalez MA        Capillary Blood Specimen Collection  Capillary blood collection performed in RIGHT POINTER FINGER  by Susu Gonzalez MA. Patient tolerated the procedure well without complications.   09/14/22   Susu Gonzalez MA

## 2022-09-15 ENCOUNTER — HOSPITAL ENCOUNTER (OUTPATIENT)
Dept: GENERAL RADIOLOGY | Facility: HOSPITAL | Age: 62
Discharge: HOME | End: 2022-09-15

## 2022-09-15 ENCOUNTER — LAB (OUTPATIENT)
Dept: LAB | Facility: HOSPITAL | Age: 62
End: 2022-09-15

## 2022-09-15 ENCOUNTER — OFFICE VISIT (OUTPATIENT)
Dept: FAMILY MEDICINE CLINIC | Facility: CLINIC | Age: 62
End: 2022-09-15

## 2022-09-15 VITALS
TEMPERATURE: 96.9 F | BODY MASS INDEX: 41.48 KG/M2 | HEIGHT: 64 IN | OXYGEN SATURATION: 96 % | HEART RATE: 78 BPM | SYSTOLIC BLOOD PRESSURE: 152 MMHG | RESPIRATION RATE: 18 BRPM | DIASTOLIC BLOOD PRESSURE: 77 MMHG | WEIGHT: 243 LBS

## 2022-09-15 DIAGNOSIS — R10.31 RIGHT LOWER QUADRANT ABDOMINAL PAIN: ICD-10-CM

## 2022-09-15 DIAGNOSIS — E11.65 TYPE 2 DIABETES MELLITUS WITH HYPERGLYCEMIA, WITHOUT LONG-TERM CURRENT USE OF INSULIN: Primary | Chronic | ICD-10-CM

## 2022-09-15 LAB
ALBUMIN SERPL-MCNC: 4.27 G/DL (ref 3.5–5.2)
ALBUMIN/GLOB SERPL: 1.4 G/DL
ALP SERPL-CCNC: 75 U/L (ref 39–117)
ALT SERPL W P-5'-P-CCNC: 18 U/L (ref 1–33)
ANION GAP SERPL CALCULATED.3IONS-SCNC: 12.3 MMOL/L (ref 5–15)
AST SERPL-CCNC: 16 U/L (ref 1–32)
BASOPHILS # BLD AUTO: 0.08 10*3/MM3 (ref 0–0.2)
BASOPHILS NFR BLD AUTO: 0.9 % (ref 0–1.5)
BILIRUB BLD-MCNC: NEGATIVE MG/DL
BILIRUB SERPL-MCNC: 0.5 MG/DL (ref 0–1.2)
BUN SERPL-MCNC: 14 MG/DL (ref 8–23)
BUN/CREAT SERPL: 21.5 (ref 7–25)
CALCIUM SPEC-SCNC: 9.3 MG/DL (ref 8.6–10.5)
CHLORIDE SERPL-SCNC: 105 MMOL/L (ref 98–107)
CLARITY, POC: ABNORMAL
CO2 SERPL-SCNC: 22.7 MMOL/L (ref 22–29)
COLOR UR: YELLOW
CREAT SERPL-MCNC: 0.65 MG/DL (ref 0.57–1)
DEPRECATED RDW RBC AUTO: 44.8 FL (ref 37–54)
EGFRCR SERPLBLD CKD-EPI 2021: 100.3 ML/MIN/1.73
EOSINOPHIL # BLD AUTO: 0.25 10*3/MM3 (ref 0–0.4)
EOSINOPHIL NFR BLD AUTO: 2.7 % (ref 0.3–6.2)
ERYTHROCYTE [DISTWIDTH] IN BLOOD BY AUTOMATED COUNT: 13.1 % (ref 12.3–15.4)
GLOBULIN UR ELPH-MCNC: 3 GM/DL
GLUCOSE SERPL-MCNC: 102 MG/DL (ref 65–99)
GLUCOSE UR STRIP-MCNC: ABNORMAL MG/DL
HBA1C MFR BLD: 6.8 % (ref 4.8–5.6)
HCT VFR BLD AUTO: 45.4 % (ref 34–46.6)
HGB BLD-MCNC: 13.8 G/DL (ref 12–15.9)
IMM GRANULOCYTES # BLD AUTO: 0.03 10*3/MM3 (ref 0–0.05)
IMM GRANULOCYTES NFR BLD AUTO: 0.3 % (ref 0–0.5)
KETONES UR QL: NEGATIVE
LEUKOCYTE EST, POC: ABNORMAL
LYMPHOCYTES # BLD AUTO: 2.54 10*3/MM3 (ref 0.7–3.1)
LYMPHOCYTES NFR BLD AUTO: 27.1 % (ref 19.6–45.3)
MCH RBC QN AUTO: 28.5 PG (ref 26.6–33)
MCHC RBC AUTO-ENTMCNC: 30.4 G/DL (ref 31.5–35.7)
MCV RBC AUTO: 93.6 FL (ref 79–97)
MONOCYTES # BLD AUTO: 0.63 10*3/MM3 (ref 0.1–0.9)
MONOCYTES NFR BLD AUTO: 6.7 % (ref 5–12)
NEUTROPHILS NFR BLD AUTO: 5.85 10*3/MM3 (ref 1.7–7)
NEUTROPHILS NFR BLD AUTO: 62.3 % (ref 42.7–76)
NITRITE UR-MCNC: NEGATIVE MG/ML
NRBC BLD AUTO-RTO: 0 /100 WBC (ref 0–0.2)
PH UR: 6 [PH] (ref 5–8)
PLATELET # BLD AUTO: 241 10*3/MM3 (ref 140–450)
PMV BLD AUTO: 10.9 FL (ref 6–12)
POTASSIUM SERPL-SCNC: 4.2 MMOL/L (ref 3.5–5.2)
PROT SERPL-MCNC: 7.3 G/DL (ref 6–8.5)
PROT UR STRIP-MCNC: ABNORMAL MG/DL
RBC # BLD AUTO: 4.85 10*6/MM3 (ref 3.77–5.28)
RBC # UR STRIP: ABNORMAL /UL
SODIUM SERPL-SCNC: 140 MMOL/L (ref 136–145)
SP GR UR: 1.02 (ref 1–1.03)
UROBILINOGEN UR QL: ABNORMAL
WBC NRBC COR # BLD: 9.38 10*3/MM3 (ref 3.4–10.8)

## 2022-09-15 PROCEDURE — 85025 COMPLETE CBC W/AUTO DIFF WBC: CPT | Performed by: NURSE PRACTITIONER

## 2022-09-15 PROCEDURE — 99214 OFFICE O/P EST MOD 30 MIN: CPT | Performed by: NURSE PRACTITIONER

## 2022-09-15 PROCEDURE — 74018 RADEX ABDOMEN 1 VIEW: CPT

## 2022-09-15 PROCEDURE — 80053 COMPREHEN METABOLIC PANEL: CPT | Performed by: NURSE PRACTITIONER

## 2022-09-15 PROCEDURE — 81002 URINALYSIS NONAUTO W/O SCOPE: CPT | Performed by: NURSE PRACTITIONER

## 2022-09-15 PROCEDURE — 74018 RADEX ABDOMEN 1 VIEW: CPT | Performed by: RADIOLOGY

## 2022-09-15 RX ORDER — LANCETS 30 GAUGE
1 EACH MISCELLANEOUS 2 TIMES DAILY
Qty: 100 EACH | Refills: 12 | Status: SHIPPED | OUTPATIENT
Start: 2022-09-15

## 2022-09-15 RX ORDER — BLOOD-GLUCOSE METER
1 KIT MISCELLANEOUS DAILY
Qty: 1 EACH | Refills: 0 | Status: SHIPPED | OUTPATIENT
Start: 2022-09-15

## 2022-09-15 NOTE — PROGRESS NOTES
A1C is 6.8. Normal A1C is under 6.0. This means she has diabetes. I recommend she come in to discuss treatment.

## 2022-09-15 NOTE — PROGRESS NOTES
Labs are not showing any sign of infection but her urine has leukocytes and blood. I am sending it for culture.

## 2022-09-15 NOTE — PROGRESS NOTES
Subjective   Ana Lilia Leach is a 62 y.o. female.     Chief Complaint   Patient presents with   • Abdominal Pain     A1c discussion of treatment        History of Present Illness  She presents for follow up of type II DM. A1C was 6.8. She has been researching low carb diet and would like to control her blood sugar with diet and exercising. She also presents with c/o abdominal pain. She states in July she was eating [employer] spaghetti. A couple days after that, she developed slow bowel movements and started getting a twinge on the right side. She states it hasn't stopped and has gotten worse. She describes it has severe constipation pain with diarrhea. She is passing gas. She denies blood in the stool. The morning before last, she woke up with shooting pains across her stomach. She had nausea but didn't vomit. She had diarrhea through the day. Diarrhea helps the pain. She has had her appendix removed in the past. She had a colonoscopy in May of 2021 with polyps removed.        The following portions of the patient's history were reviewed and updated as appropriate: allergies, current medications, past family history, past medical history, past social history, past surgical history and problem list.    Review of Systems   Constitutional: Negative for fatigue, fever and unexpected weight change.   HENT: Negative for ear pain, rhinorrhea and sore throat.    Eyes: Negative for visual disturbance.   Respiratory: Negative for cough, chest tightness and shortness of breath.    Cardiovascular: Negative for chest pain, palpitations and leg swelling.   Gastrointestinal: Negative for abdominal pain, blood in stool, constipation, diarrhea, nausea and vomiting.   Endocrine: Negative for cold intolerance and heat intolerance.   Genitourinary: Negative for dysuria.   Musculoskeletal: Negative for arthralgias and myalgias.   Skin: Negative for color change.   Allergic/Immunologic: Negative for environmental allergies.  "  Hematological: Negative for adenopathy.   Psychiatric/Behavioral: Negative for suicidal ideas. The patient is not nervous/anxious.        Objective     /77 (BP Location: Left arm, Patient Position: Sitting, Cuff Size: Adult)   Pulse 78   Temp 96.9 °F (36.1 °C) (Temporal)   Resp 18   Ht 162.6 cm (64.02\")   Wt 110 kg (243 lb)   SpO2 96%   BMI 41.69 kg/m²     Physical Exam  Vitals reviewed.   Constitutional:       General: She is not in acute distress.     Appearance: Normal appearance. She is well-developed. She is not diaphoretic.   HENT:      Head: Normocephalic and atraumatic.   Cardiovascular:      Rate and Rhythm: Normal rate and regular rhythm.      Heart sounds: Normal heart sounds, S1 normal and S2 normal. No murmur heard.    No friction rub. No gallop.   Pulmonary:      Effort: Pulmonary effort is normal. No respiratory distress.      Breath sounds: Normal breath sounds.   Abdominal:      General: Bowel sounds are normal. There is no distension.      Palpations: Abdomen is soft.      Tenderness: There is abdominal tenderness in the right lower quadrant. There is no guarding or rebound.   Skin:     General: Skin is warm and dry.   Neurological:      Mental Status: She is alert and oriented to person, place, and time.   Psychiatric:         Behavior: Behavior normal.         Thought Content: Thought content normal.         Judgment: Judgment normal.         Current Outpatient Medications   Medication Sig Dispense Refill   • escitalopram (LEXAPRO) 10 MG tablet Take 1 tablet by mouth Daily. 90 tablet 0   • LORazepam (ATIVAN) 1 MG tablet Take 1 tablet by mouth Take As Directed. 2 tablet 0   • nadolol (CORGARD) 40 MG tablet TAKE 1 TABLET EVERY DAY 90 tablet 3   • pantoprazole (PROTONIX) 40 MG EC tablet Take 1 tablet by mouth Daily. 90 tablet 3   • glucose blood test strip Use as instructed 100 each 12   • glucose monitor monitoring kit 1 each Daily. 1 each 0   • Lancets misc 1 Device 2 (Two) Times a " Day. 100 each 12     No current facility-administered medications for this visit.            Assessment & Plan     Problem List Items Addressed This Visit    None     Visit Diagnoses     Type 2 diabetes mellitus with hyperglycemia, without long-term current use of insulin ([facility])    -  Primary    Relevant Medications    glucose blood test strip    glucose monitor monitoring kit    Lancets misc    Right lower quadrant abdominal pain        Relevant Orders    CBC & Differential    Comprehensive Metabolic Panel    XR Abdomen KUB    POC Urinalysis Dipstick            ICD-10-CM ICD-9-CM   1. Type 2 diabetes mellitus with hyperglycemia, without long-term current use of insulin (HCC)  E11.65 250.00     790.29   2. Right lower quadrant abdominal pain  R10.31 789.03       Plan: Urinalysis has glucose, blood, protein, and leukocytes. Send for culture.  Tried low carb diet and exercise and recheck A1C in 3 months. Get labs and xray of the abdomen to look for cause of pain. She suspects she is constipated. Follow up in 3 months and pending results.     @Body mass index is 41.69 kg/m².           Understands disease processes and need for medications.  Understands reasons for urgent and emergent care.  Patient (& family) verbalized agreement for treatment plan.   Emotional support and active listening provided.  Patient provided time to verbalize feelings.           Class 3 Severe Obesity (BMI >=40). Obesity-related health conditions include the following: diabetes mellitus. Obesity is unchanged. BMI is is above average; BMI management plan is completed. We discussed portion control and increasing exercise.      This document has been electronically signed by MICHAEL Desai   September 15, 2022 14:05 EDT

## 2022-09-16 DIAGNOSIS — R31.9 URINARY TRACT INFECTION WITH HEMATURIA, SITE UNSPECIFIED: Primary | ICD-10-CM

## 2022-09-16 DIAGNOSIS — N39.0 URINARY TRACT INFECTION WITH HEMATURIA, SITE UNSPECIFIED: Primary | ICD-10-CM

## 2022-09-16 RX ORDER — SULFAMETHOXAZOLE AND TRIMETHOPRIM 800; 160 MG/1; MG/1
1 TABLET ORAL 2 TIMES DAILY
Qty: 20 TABLET | Refills: 0 | Status: SHIPPED | OUTPATIENT
Start: 2022-09-16 | End: 2022-12-20

## 2022-09-16 NOTE — PROGRESS NOTES
Xray of the abdomen reports as showing stable kidney stones in the left kidney. It does not show any cause for her pain. She does have a uti. I am sending antibiotics to the pharmacy.

## 2022-09-17 LAB
BACTERIA UR CULT: NORMAL
BACTERIA UR CULT: NORMAL

## 2022-10-04 RX ORDER — ESCITALOPRAM OXALATE 10 MG/1
10 TABLET ORAL DAILY
Qty: 90 TABLET | Refills: 0 | Status: SHIPPED | OUTPATIENT
Start: 2022-10-04 | End: 2022-11-28 | Stop reason: SDUPTHER

## 2022-10-04 NOTE — TELEPHONE ENCOUNTER
Caller: Ana Lilia Leach    Relationship: Self    Best call back number: [phone]    Requested Prescriptions:   Requested Prescriptions     Pending Prescriptions Disp Refills   • escitalopram (LEXAPRO) 10 MG tablet 90 tablet 0     Sig: Take 1 tablet by mouth Daily.        Pharmacy where request should be sent: [facility] - [facility] [address] - [phone]  - [phone]     Additional details provided by patient: PT HAS SEVERAL DAYS LEFT    Does the patient have less than a 3 day supply:  [] Yes  [x] No    Meenu Hanson Rep   10/04/22 10:15 EDT

## 2022-11-28 RX ORDER — ESCITALOPRAM OXALATE 10 MG/1
10 TABLET ORAL DAILY
Qty: 90 TABLET | Refills: 0 | Status: SHIPPED | OUTPATIENT
Start: 2022-11-28 | End: 2023-01-20

## 2022-12-20 ENCOUNTER — TELEMEDICINE (OUTPATIENT)
Dept: FAMILY MEDICINE CLINIC | Facility: CLINIC | Age: 62
End: 2022-12-20

## 2022-12-20 DIAGNOSIS — U07.1 COVID-19: Primary | ICD-10-CM

## 2022-12-20 PROCEDURE — 99213 OFFICE O/P EST LOW 20 MIN: CPT | Performed by: NURSE PRACTITIONER

## 2022-12-20 NOTE — PROGRESS NOTES
Subjective   Ana Lilia Leach is a 62 y.o. female.     Chief Complaint   Patient presents with   • Covid-19 Home Monitoring Phone Call       History of Present Illness  She presents via video visit with technical issues, converted to telephone visit. She states her son was diagnosed with covid. She developed a tickle in her throat and a runny nose. She had a positive at home covid test today. She states her right lung is hurting and she has some back pain.        The following portions of the patient's history were reviewed and updated as appropriate: allergies, current medications, past family history, past medical history, past social history, past surgical history and problem list.    Review of Systems   Constitutional: Negative for fatigue, fever and unexpected weight change.   HENT: Positive for postnasal drip, rhinorrhea and sore throat. Negative for ear pain.    Respiratory: Positive for cough and wheezing. Negative for chest tightness and shortness of breath.    Cardiovascular: Negative for chest pain and palpitations.   Gastrointestinal: Negative for abdominal pain, blood in stool, constipation, diarrhea, nausea and vomiting.   Genitourinary: Negative for dysuria and hematuria.   Allergic/Immunologic: Positive for environmental allergies.   Neurological: Positive for light-headedness.   Psychiatric/Behavioral: Negative for suicidal ideas. The patient is not nervous/anxious.        Objective     There were no vitals taken for this visit.    Physical Exam  Pulmonary:      Effort: Pulmonary effort is normal. No respiratory distress.   Neurological:      General: No focal deficit present.      Mental Status: She is alert and oriented to person, place, and time.   Psychiatric:         Mood and Affect: Mood normal.         Behavior: Behavior normal.         Current Outpatient Medications   Medication Sig Dispense Refill   • escitalopram (LEXAPRO) 10 MG tablet Take 1 tablet by mouth Daily. 90 tablet 0   • glucose  blood test strip Use as instructed 100 each 12   • glucose monitor monitoring kit 1 each Daily. 1 each 0   • Lancets misc 1 Device 2 (Two) Times a Day. 100 each 12   • LORazepam (ATIVAN) 1 MG tablet Take 1 tablet by mouth Take As Directed. 2 tablet 0   • nadolol (CORGARD) 40 MG tablet TAKE 1 TABLET EVERY DAY 90 tablet 3   • Nirmatrelvir&Ritonavir 300/100 (PAXLOVID) 20 x 150 MG & 10 x 100MG tablet therapy pack tablet Take 3 tablets by mouth 2 (Two) Times a Day for 5 days. 30 each 0   • pantoprazole (PROTONIX) 40 MG EC tablet Take 1 tablet by mouth Daily. 90 tablet 3     No current facility-administered medications for this visit.            Assessment & Plan     Problem List Items Addressed This Visit    None  Visit Diagnoses     COVID-19    -  Primary    Relevant Medications    Nirmatrelvir&Ritonavir 300/100 (PAXLOVID) 20 x 150 MG & 10 x 100MG tablet therapy pack tablet            ICD-10-CM ICD-9-CM   1. COVID-19  U07.1 079.89         Plan: We discussed paxlovid and that it has the risk of rebound covid. She would like to try it. Paxlovid prescribed. Go to ER with shortness of breath or dehydration.     @There is no height or weight on file to calculate BMI.              Understands disease processes and need for medications.  Understands reasons for urgent and emergent care.  Patient (& family) verbalized agreement for treatment plan.   Emotional support and active listening provided.  Patient provided time to verbalize feelings.         The use of a video visit has been reviewed with the patient and verbal informed consent has been obtained.        This document has been electronically signed by MICHAEL Desai   December 20, 2022 14:14 EST

## 2022-12-21 ENCOUNTER — TELEPHONE (OUTPATIENT)
Dept: FAMILY MEDICINE CLINIC | Facility: CLINIC | Age: 62
End: 2022-12-21

## 2022-12-21 NOTE — TELEPHONE ENCOUNTER
Called Patient notified that I had called [facility] and fixed her insurance and it was fully covered they were getting it ready for her and she verbalized understanding

## 2022-12-21 NOTE — TELEPHONE ENCOUNTER
Spoke with Ruddy Solis who stated to instruct patient to take anti viral and wait 24 hours if not feeling any better by tomorrow call office to schedule a appointment. Most medications need at least 24 hrs before you feel the effects.

## 2022-12-21 NOTE — TELEPHONE ENCOUNTER
Caller: Ana Lilia Leach    Relationship: Self    Best call back number: [phone]    What medication are you requesting:     ANTIBIOTIC    What are your current symptoms:     COUGHING AND BLOWING YELLOW STUFF    How long have you been experiencing symptoms:  SINCE YESTERDAY      If a prescription is needed, what is your preferred pharmacy and phone number: [facility] [address] [phone] [facility] [phone]      Additional notes:    PICKED UP ANTI VIRAL FROM [facility] AND THAT PHARMACIST RECOMMENDED ANTIBIOTICS.  [facility] DIDN'T HAVE ANTI VIRAL. PATIENT WOULD LIKE ANTIBIOTIC SENT TO [facility]

## 2023-01-20 ENCOUNTER — TELEMEDICINE (OUTPATIENT)
Dept: FAMILY MEDICINE CLINIC | Facility: CLINIC | Age: 63
End: 2023-01-20
Payer: MEDICARE

## 2023-01-20 VITALS
SYSTOLIC BLOOD PRESSURE: 159 MMHG | HEART RATE: 63 BPM | TEMPERATURE: 96.3 F | BODY MASS INDEX: 39.29 KG/M2 | WEIGHT: 229 LBS | DIASTOLIC BLOOD PRESSURE: 86 MMHG

## 2023-01-20 DIAGNOSIS — I10 ESSENTIAL HYPERTENSION: Primary | Chronic | ICD-10-CM

## 2023-01-20 DIAGNOSIS — E78.2 MIXED HYPERLIPIDEMIA: Chronic | ICD-10-CM

## 2023-01-20 DIAGNOSIS — E11.65 TYPE 2 DIABETES MELLITUS WITH HYPERGLYCEMIA, WITHOUT LONG-TERM CURRENT USE OF INSULIN: Chronic | ICD-10-CM

## 2023-01-20 PROCEDURE — 99214 OFFICE O/P EST MOD 30 MIN: CPT | Performed by: NURSE PRACTITIONER

## 2023-01-20 RX ORDER — ESCITALOPRAM OXALATE 10 MG/1
TABLET ORAL
Qty: 90 TABLET | Refills: 0 | Status: SHIPPED | OUTPATIENT
Start: 2023-01-20

## 2023-01-20 NOTE — PROGRESS NOTES
Subjective   Ana Lilia Leach is a 62 y.o. female.     Chief Complaint   Patient presents with   • Hypertension       History of Present Illness  She presents via video visit for follow up of essential hypertension, mixed hyperlipidemia, and type II DM. She states her blood pressure and blood sugar are high today. She states her blood pressure has been fine. She only checks it if she feels it is high. She states it is because she ate a quarter pounder. She has been eating keto bread to help control her blood sugar. She states her blood sugar only goes up a few points after eating. She has changed her diet and has been eating salads, beef, and brocoli. She tries to control lipids with diet as well. She c/o a little sinus drainage but states she is tolerating it and doesn't want any meds for it now. Visit was changed to telephone visit after she was not able to gain sound in the video.       The following portions of the patient's history were reviewed and updated as appropriate: allergies, current medications, past family history, past medical history, past social history, past surgical history and problem list.    Review of Systems   Constitutional: Negative for fatigue, fever and unexpected weight change.   HENT: Positive for postnasal drip.    Respiratory: Negative for cough, chest tightness, shortness of breath and wheezing.    Cardiovascular: Negative for chest pain and palpitations.   Gastrointestinal: Negative for abdominal pain, blood in stool, constipation, diarrhea, nausea and vomiting.   Genitourinary: Negative for dysuria and hematuria.   Allergic/Immunologic: Negative for environmental allergies.   Hematological: Negative for adenopathy.   Psychiatric/Behavioral: Negative for suicidal ideas. The patient is not nervous/anxious.        Objective     /86   Pulse 63   Temp 96.3 °F (35.7 °C)   Wt 104 kg (229 lb)   BMI 39.29 kg/m²     Physical Exam  Vitals reviewed.   Constitutional:       General:  She is not in acute distress.     Appearance: Normal appearance.   HENT:      Head: Normocephalic and atraumatic.   Pulmonary:      Effort: Pulmonary effort is normal. No respiratory distress.   Neurological:      Mental Status: She is alert and oriented to person, place, and time.   Psychiatric:         Mood and Affect: Mood normal.         Behavior: Behavior normal.         Current Outpatient Medications   Medication Sig Dispense Refill   • escitalopram (LEXAPRO) 10 MG tablet TAKE 1 TABLET EVERY DAY 90 tablet 0   • glucose blood test strip Use as instructed 100 each 12   • glucose monitor monitoring kit 1 each Daily. 1 each 0   • Lancets misc 1 Device 2 (Two) Times a Day. 100 each 12   • LORazepam (ATIVAN) 1 MG tablet Take 1 tablet by mouth Take As Directed. 2 tablet 0   • nadolol (CORGARD) 40 MG tablet TAKE 1 TABLET EVERY DAY 90 tablet 3   • pantoprazole (PROTONIX) 40 MG EC tablet Take 1 tablet by mouth Daily. 90 tablet 3     No current facility-administered medications for this visit.            Assessment & Plan     Problem List Items Addressed This Visit        Cardiac and Vasculature    Essential hypertension - Primary (Chronic)    Relevant Orders    CBC & Differential    Comprehensive Metabolic Panel    Mixed hyperlipidemia    Relevant Orders    Lipid Panel       Endocrine and Metabolic    Type 2 diabetes mellitus with hyperglycemia, without long-term current use of insulin (HCC)    Relevant Orders    Hemoglobin A1c    MicroAlbumin, Urine, Random - Urine, Clean Catch         ICD-10-CM ICD-9-CM   1. Essential hypertension  I10 401.9   2. Type 2 diabetes mellitus with hyperglycemia, without long-term current use of insulin (HCC)  E11.65 250.00     790.29   3. Mixed hyperlipidemia  E78.2 272.2       Plan: Your blood pressure is elevated. She declines adding another medication. Check your blood pressure daily and write it down and bring to your next follow up to discuss. Get labs including A1C. Follow up in one  month and as needed.     As a means of preventing the spread of the COVID 19, this visit was done virtually via video platform through [facility] as consented by the patient. Patient was at home and provider at [facility] office during this visit. The patient consented to this telehealth visit and signed the video visit consent form. This visit included audio and video interaction. There were no technical issues that occurred during this visit.     @Body mass index is 39.29 kg/m².                Understands disease processes and need for medications.  Understands reasons for urgent and emergent care.  Patient (& family) verbalized agreement for treatment plan.   Emotional support and active listening provided.  Patient provided time to verbalize feelings.           Class 3 Severe Obesity (BMI >=40). Obesity-related health conditions include the following: hypertension. Obesity is improving with lifestyle modifications. BMI is is above average; BMI management plan is completed. We discussed low calorie, low carb based diet program.      This document has been electronically signed by MICHAEL Desai   January 20, 2023 17:02 EST

## 2023-02-14 ENCOUNTER — CLINICAL SUPPORT (OUTPATIENT)
Dept: FAMILY MEDICINE CLINIC | Facility: CLINIC | Age: 63
End: 2023-02-14
Payer: MEDICARE

## 2023-02-14 DIAGNOSIS — E78.2 MIXED HYPERLIPIDEMIA: Primary | ICD-10-CM

## 2023-02-14 DIAGNOSIS — E11.65 TYPE 2 DIABETES MELLITUS WITH HYPERGLYCEMIA, WITHOUT LONG-TERM CURRENT USE OF INSULIN: ICD-10-CM

## 2023-02-14 DIAGNOSIS — I10 ESSENTIAL HYPERTENSION: Chronic | ICD-10-CM

## 2023-02-14 PROCEDURE — 82043 UR ALBUMIN QUANTITATIVE: CPT | Performed by: NURSE PRACTITIONER

## 2023-02-14 PROCEDURE — 36415 COLL VENOUS BLD VENIPUNCTURE: CPT | Performed by: NURSE PRACTITIONER

## 2023-02-14 NOTE — PROGRESS NOTES
Venipuncture Blood Specimen Collection  Venipuncture performed in Left arm by Susu Gonzalez MA with good hemostasis. Patient tolerated the procedure well without complications.   02/14/23   Susu Gonzalez MA

## 2023-02-15 LAB
ALBUMIN SERPL-MCNC: 4.4 G/DL (ref 3.5–5.2)
ALBUMIN/GLOB SERPL: 2.1 G/DL
ALP SERPL-CCNC: 76 U/L (ref 39–117)
ALT SERPL-CCNC: 28 U/L (ref 1–33)
AST SERPL-CCNC: 16 U/L (ref 1–32)
BASOPHILS # BLD AUTO: 0.04 10*3/MM3 (ref 0–0.2)
BASOPHILS NFR BLD AUTO: 0.5 % (ref 0–1.5)
BILIRUB SERPL-MCNC: 0.5 MG/DL (ref 0–1.2)
BUN SERPL-MCNC: 9 MG/DL (ref 8–23)
BUN/CREAT SERPL: 14.1 (ref 7–25)
CALCIUM SERPL-MCNC: 8.9 MG/DL (ref 8.6–10.5)
CHLORIDE SERPL-SCNC: 103 MMOL/L (ref 98–107)
CHOLEST SERPL-MCNC: 184 MG/DL (ref 0–200)
CO2 SERPL-SCNC: 27 MMOL/L (ref 22–29)
CREAT SERPL-MCNC: 0.64 MG/DL (ref 0.57–1)
EOSINOPHIL # BLD AUTO: 0.22 10*3/MM3 (ref 0–0.4)
EOSINOPHIL NFR BLD AUTO: 2.9 % (ref 0.3–6.2)
ERYTHROCYTE [DISTWIDTH] IN BLOOD BY AUTOMATED COUNT: 13 % (ref 12.3–15.4)
GLOBULIN SER CALC-MCNC: 2.1 GM/DL
GLUCOSE SERPL-MCNC: 125 MG/DL (ref 65–99)
HBA1C MFR BLD: 5.6 % (ref 4.8–5.6)
HCT VFR BLD AUTO: 41.1 % (ref 34–46.6)
HDLC SERPL-MCNC: 36 MG/DL (ref 40–60)
HGB BLD-MCNC: 13 G/DL (ref 12–15.9)
IMM GRANULOCYTES # BLD AUTO: 0.03 10*3/MM3 (ref 0–0.05)
IMM GRANULOCYTES NFR BLD AUTO: 0.4 % (ref 0–0.5)
LDLC SERPL CALC-MCNC: 100 MG/DL (ref 0–100)
LYMPHOCYTES # BLD AUTO: 1.39 10*3/MM3 (ref 0.7–3.1)
LYMPHOCYTES NFR BLD AUTO: 18.3 % (ref 19.6–45.3)
MCH RBC QN AUTO: 26.6 PG (ref 26.6–33)
MCHC RBC AUTO-ENTMCNC: 31.6 G/DL (ref 31.5–35.7)
MCV RBC AUTO: 84.2 FL (ref 79–97)
MICROALBUMIN UR-MCNC: 37.7 UG/ML
MONOCYTES # BLD AUTO: 0.52 10*3/MM3 (ref 0.1–0.9)
MONOCYTES NFR BLD AUTO: 6.9 % (ref 5–12)
NEUTROPHILS # BLD AUTO: 5.38 10*3/MM3 (ref 1.7–7)
NEUTROPHILS NFR BLD AUTO: 71 % (ref 42.7–76)
NRBC BLD AUTO-RTO: 0 /100 WBC (ref 0–0.2)
PLATELET # BLD AUTO: 219 10*3/MM3 (ref 140–450)
POTASSIUM SERPL-SCNC: 3.8 MMOL/L (ref 3.5–5.2)
PROT SERPL-MCNC: 6.5 G/DL (ref 6–8.5)
RBC # BLD AUTO: 4.88 10*6/MM3 (ref 3.77–5.28)
SODIUM SERPL-SCNC: 141 MMOL/L (ref 136–145)
TRIGL SERPL-MCNC: 282 MG/DL (ref 0–150)
VLDLC SERPL CALC-MCNC: 48 MG/DL (ref 5–40)
WBC # BLD AUTO: 7.58 10*3/MM3 (ref 3.4–10.8)

## 2023-02-20 ENCOUNTER — TELEMEDICINE (OUTPATIENT)
Dept: FAMILY MEDICINE CLINIC | Facility: CLINIC | Age: 63
End: 2023-02-20
Payer: MEDICARE

## 2023-02-20 DIAGNOSIS — I10 ESSENTIAL HYPERTENSION: Primary | Chronic | ICD-10-CM

## 2023-02-20 DIAGNOSIS — E11.65 TYPE 2 DIABETES MELLITUS WITH HYPERGLYCEMIA, WITHOUT LONG-TERM CURRENT USE OF INSULIN: Chronic | ICD-10-CM

## 2023-02-20 PROCEDURE — 99214 OFFICE O/P EST MOD 30 MIN: CPT | Performed by: NURSE PRACTITIONER

## 2023-02-20 PROCEDURE — 3044F HG A1C LEVEL LT 7.0%: CPT | Performed by: NURSE PRACTITIONER

## 2023-02-20 RX ORDER — AMLODIPINE BESYLATE 5 MG/1
5 TABLET ORAL DAILY
Qty: 90 TABLET | Refills: 1 | Status: SHIPPED | OUTPATIENT
Start: 2023-02-20

## 2023-02-20 NOTE — PROGRESS NOTES
Subjective   Ana Lilia Leach is a 62 y.o. female.     Chief Complaint   Patient presents with   • Hypertension   • Diabetes       History of Present Illness  She presents via video visit for follow up of essential hypertension and type II DM. She states her blood pressure is running 140/80 and sometimes 160. She states she is going through a lot right now and has been packing to move. She states she takes nadolol 20mg daily. She states her heart rate is fine with that. She states her blood sugar is averaging about 120-130. She is trying to be very careful with what she eats.        The following portions of the patient's history were reviewed and updated as appropriate: allergies, current medications, past family history, past medical history, past social history, past surgical history and problem list.    Review of Systems   Constitutional: Negative for fatigue, fever and unexpected weight change.   Respiratory: Negative for cough, shortness of breath and wheezing.    Cardiovascular: Negative for chest pain and palpitations.   Gastrointestinal: Negative for abdominal pain, blood in stool, constipation, diarrhea, nausea and vomiting.   Genitourinary: Negative for dysuria and hematuria.   Allergic/Immunologic: Negative for environmental allergies.   Neurological: Negative for dizziness and headaches.   Psychiatric/Behavioral: Negative for suicidal ideas. The patient is not nervous/anxious.        Objective     There were no vitals taken for this visit.    Physical Exam  Constitutional:       Appearance: Normal appearance.   HENT:      Head: Normocephalic and atraumatic.   Pulmonary:      Effort: Pulmonary effort is normal. No respiratory distress.   Neurological:      Mental Status: She is alert and oriented to person, place, and time.   Psychiatric:         Mood and Affect: Mood normal.         Behavior: Behavior normal.         Current Outpatient Medications   Medication Sig Dispense Refill   • amLODIPine  (NORVASC) 5 MG tablet Take 1 tablet by mouth Daily. 90 tablet 1   • escitalopram (LEXAPRO) 10 MG tablet TAKE 1 TABLET EVERY DAY 90 tablet 0   • glucose blood test strip Use as instructed 100 each 12   • glucose monitor monitoring kit 1 each Daily. 1 each 0   • Lancets misc 1 Device 2 (Two) Times a Day. 100 each 12   • LORazepam (ATIVAN) 1 MG tablet Take 1 tablet by mouth Take As Directed. 2 tablet 0   • nadolol (CORGARD) 40 MG tablet TAKE 1 TABLET EVERY DAY 90 tablet 3   • pantoprazole (PROTONIX) 40 MG EC tablet Take 1 tablet by mouth Daily. 90 tablet 3     No current facility-administered medications for this visit.            Assessment & Plan     Problem List Items Addressed This Visit        Cardiac and Vasculature    Essential hypertension - Primary (Chronic)    Relevant Medications    amLODIPine (NORVASC) 5 MG tablet       Endocrine and Metabolic    Type 2 diabetes mellitus with hyperglycemia, without long-term current use of insulin (HCC)         ICD-10-CM ICD-9-CM   1. Essential hypertension  I10 401.9   2. Type 2 diabetes mellitus with hyperglycemia, without long-term current use of insulin (HCC)  E11.65 250.00     790.29       Plan: CBC, CMP, Lipids, A1C reviewed. Triglycerides elevated. We discussed fish oil supplement. A1C is 5.6. She has done an excellent job getting her A1C in control. Add amlodipine for better blood pressure control. Follow up in one month.     @There is no height or weight on file to calculate BMI.     As a means of preventing the spread of the COVID 19, this visit was done virtually via video platform through [employer] as consented by the patient. Patient [place] and [place] [facility] [place]. The patient consented to this telehealth visit and signed the video visit consent form. This visit included audio and video interaction. There were no technical issues that occurred during this visit.              Understands disease processes and need for medications.   Understands reasons for urgent and emergent care.  Patient (& family) verbalized agreement for treatment plan.   Emotional support and active listening provided.  Patient provided time to verbalize feelings.                 This document has been electronically signed by MICHAEL Desai   February 20, 2023 16:22 EST

## 2023-03-27 RX ORDER — NADOLOL 40 MG/1
40 TABLET ORAL DAILY
Qty: 90 TABLET | Refills: 3 | Status: SHIPPED | OUTPATIENT
Start: 2023-03-27

## 2023-05-12 ENCOUNTER — OFFICE VISIT (OUTPATIENT)
Dept: FAMILY MEDICINE CLINIC | Facility: CLINIC | Age: 63
End: 2023-05-12
Payer: MEDICARE

## 2023-05-12 ENCOUNTER — TELEPHONE (OUTPATIENT)
Dept: FAMILY MEDICINE CLINIC | Facility: CLINIC | Age: 63
End: 2023-05-12

## 2023-05-12 VITALS
HEART RATE: 78 BPM | DIASTOLIC BLOOD PRESSURE: 80 MMHG | RESPIRATION RATE: 18 BRPM | WEIGHT: 228 LBS | OXYGEN SATURATION: 97 % | TEMPERATURE: 98 F | HEIGHT: 64 IN | BODY MASS INDEX: 38.93 KG/M2 | SYSTOLIC BLOOD PRESSURE: 140 MMHG

## 2023-05-12 DIAGNOSIS — M54.50 CHRONIC MIDLINE LOW BACK PAIN WITHOUT SCIATICA: Chronic | ICD-10-CM

## 2023-05-12 DIAGNOSIS — G89.29 CHRONIC MIDLINE LOW BACK PAIN WITHOUT SCIATICA: Chronic | ICD-10-CM

## 2023-05-12 DIAGNOSIS — I10 ESSENTIAL HYPERTENSION: Chronic | ICD-10-CM

## 2023-05-12 DIAGNOSIS — M54.2 NECK PAIN: Chronic | ICD-10-CM

## 2023-05-12 DIAGNOSIS — H65.192 OTHER NON-RECURRENT ACUTE NONSUPPURATIVE OTITIS MEDIA OF LEFT EAR: ICD-10-CM

## 2023-05-12 DIAGNOSIS — B00.1 FEVER BLISTER: ICD-10-CM

## 2023-05-12 DIAGNOSIS — R21 RASH: ICD-10-CM

## 2023-05-12 DIAGNOSIS — J30.2 SEASONAL ALLERGIES: Primary | Chronic | ICD-10-CM

## 2023-05-12 PROCEDURE — 3079F DIAST BP 80-89 MM HG: CPT | Performed by: NURSE PRACTITIONER

## 2023-05-12 PROCEDURE — 3044F HG A1C LEVEL LT 7.0%: CPT | Performed by: NURSE PRACTITIONER

## 2023-05-12 PROCEDURE — 1160F RVW MEDS BY RX/DR IN RCRD: CPT | Performed by: NURSE PRACTITIONER

## 2023-05-12 PROCEDURE — 1159F MED LIST DOCD IN RCRD: CPT | Performed by: NURSE PRACTITIONER

## 2023-05-12 PROCEDURE — 3077F SYST BP >= 140 MM HG: CPT | Performed by: NURSE PRACTITIONER

## 2023-05-12 PROCEDURE — 99214 OFFICE O/P EST MOD 30 MIN: CPT | Performed by: NURSE PRACTITIONER

## 2023-05-12 RX ORDER — LORATADINE 10 MG/1
10 TABLET ORAL DAILY
Qty: 30 TABLET | Refills: 5 | Status: SHIPPED | OUTPATIENT
Start: 2023-05-12

## 2023-05-12 RX ORDER — AZITHROMYCIN 250 MG/1
TABLET, FILM COATED ORAL
Qty: 6 TABLET | Refills: 0 | Status: SHIPPED | OUTPATIENT
Start: 2023-05-12

## 2023-05-12 RX ORDER — ACYCLOVIR 50 MG/G
1 OINTMENT TOPICAL
Qty: 15 G | Refills: 1 | Status: SHIPPED | OUTPATIENT
Start: 2023-05-12

## 2023-05-12 NOTE — PROGRESS NOTES
Subjective   Ana Lilia Leach is a 62 y.o. female.     Chief Complaint   Patient presents with   • Rash   • Headache   • Hypertension   • nose sore       History of Present Illness  She presents with c/o headache for about 3 weeks. She states it is in her forehead, sinuses, and neck. It is described as pressure. She states her eyes hurt. She has been sneezing and has a runny nose. She does have neck pain. She also c/o a sore in her right nostril since the day before yesterday.  She has not tried any treatment.   She also presents for follow up of essential hypertension. She did not start the amlodipine. She also c/o a rash on her left foot for about 3 months. She noticed it after getting bit by a bug. Now it doesn't itch or burn. She states it doesn't have any symptoms but it is there all the time. She puts lotion on it but nothing else. She c/o tightness in the muscles of her low back.        The following portions of the patient's history were reviewed and updated as appropriate: allergies, current medications, past family history, past medical history, past social history, past surgical history and problem list.    Review of Systems   Constitutional: Negative for fatigue and fever.   HENT: Positive for ear pain, rhinorrhea and sneezing. Negative for sore throat.    Respiratory: Negative for cough, chest tightness, shortness of breath and wheezing.    Cardiovascular: Negative for chest pain and palpitations.   Gastrointestinal: Negative for abdominal pain, blood in stool, constipation, diarrhea, nausea and vomiting.   Genitourinary: Negative for dysuria and hematuria.   Musculoskeletal: Positive for arthralgias, myalgias and neck pain.   Skin: Positive for rash. Negative for color change.   Allergic/Immunologic: Positive for environmental allergies.   Neurological: Positive for headaches.   Psychiatric/Behavioral: Negative for suicidal ideas. The patient is not nervous/anxious.        Objective     /80 (BP  "Location: Right arm, Patient Position: Sitting, Cuff Size: Large Adult)   Pulse 78   Temp 98 °F (36.7 °C) (Temporal)   Resp 18   Ht 162.6 cm (64.02\")   Wt 103 kg (228 lb)   SpO2 97%   BMI 39.12 kg/m²     Physical Exam  Vitals reviewed.   Constitutional:       General: She is not in acute distress.     Appearance: Normal appearance. She is well-developed. She is not diaphoretic.   HENT:      Head: Normocephalic. Raccoon eyes present.      Comments: Ulcer right nare.      Right Ear: Hearing, ear canal and external ear normal. Tympanic membrane is bulging. Tympanic membrane is not erythematous.      Left Ear: Hearing, ear canal and external ear normal. Tympanic membrane is erythematous and bulging.      Nose:      Right Sinus: No maxillary sinus tenderness or frontal sinus tenderness.      Left Sinus: No maxillary sinus tenderness or frontal sinus tenderness.      Mouth/Throat:      Pharynx: Uvula midline.   Eyes:      General: Lids are normal.      Conjunctiva/sclera: Conjunctivae normal.   Neck:      Trachea: Trachea normal. No tracheal tenderness or tracheal deviation.   Cardiovascular:      Rate and Rhythm: Normal rate and regular rhythm.      Heart sounds: Normal heart sounds, S1 normal and S2 normal. No murmur heard.    No friction rub. No gallop.   Pulmonary:      Effort: Pulmonary effort is normal. No respiratory distress.      Breath sounds: Normal breath sounds.   Abdominal:      General: Bowel sounds are normal. There is no distension.      Palpations: Abdomen is soft.      Tenderness: There is no abdominal tenderness.   Musculoskeletal:      Lumbar back: Tenderness present.   Lymphadenopathy:      Cervical: No cervical adenopathy.   Skin:     General: Skin is warm and dry.      Findings: Petechiae present.      Comments: Petechial rash left lateral foot.   Neurological:      General: No focal deficit present.      Mental Status: She is alert and oriented to person, place, and time.      Cranial " Nerves: Cranial nerves 2-12 are intact.      Motor: Motor function is intact.      Coordination: Coordination is intact.      Gait: Gait is intact.      Deep Tendon Reflexes: Babinski sign absent on the right side. Babinski sign absent on the left side.      Reflex Scores:       Tricep reflexes are 2+ on the right side and 2+ on the left side.       Bicep reflexes are 2+ on the right side and 2+ on the left side.       Brachioradialis reflexes are 2+ on the right side and 2+ on the left side.       Patellar reflexes are 2+ on the right side and 2+ on the left side.  Psychiatric:         Behavior: Behavior normal.         Thought Content: Thought content normal.         Judgment: Judgment normal.         Current Outpatient Medications   Medication Sig Dispense Refill   • amLODIPine (NORVASC) 5 MG tablet Take 1 tablet by mouth Daily. 90 tablet 1   • escitalopram (LEXAPRO) 10 MG tablet TAKE 1 TABLET EVERY DAY 90 tablet 0   • glucose blood test strip Use as instructed 100 each 12   • glucose monitor monitoring kit 1 each Daily. 1 each 0   • Lancets misc 1 Device 2 (Two) Times a Day. 100 each 12   • nadolol (CORGARD) 40 MG tablet Take 1 tablet by mouth Daily. 90 tablet 3   • pantoprazole (PROTONIX) 40 MG EC tablet Take 1 tablet by mouth Daily. 90 tablet 3   • acyclovir (Zovirax) 5 % ointment Apply 1 application topically to the appropriate area as directed Every 3 (Three) Hours. 15 g 1   • azithromycin (Zithromax Z-Nilson) 250 MG tablet Take 2 tablets the first day, then 1 tablet daily for 4 days. 6 tablet 0   • loratadine (Claritin) 10 MG tablet Take 1 tablet by mouth Daily. 30 tablet 5     No current facility-administered medications for this visit.            Assessment & Plan     Problem List Items Addressed This Visit        Allergies and Adverse Reactions    Seasonal allergies - Primary (Chronic)    Relevant Medications    loratadine (Claritin) 10 MG tablet       Cardiac and Vasculature    Essential hypertension  (Chronic)       Musculoskeletal and Injuries    Chronic midline low back pain without sciatica (Chronic)    Relevant Orders    Ambulatory Referral to Physical Therapy Evaluate and treat (Completed)    Neck pain (Chronic)    Relevant Orders    Ambulatory Referral to Physical Therapy Evaluate and treat (Completed)   Other Visit Diagnoses     Other non-recurrent acute nonsuppurative otitis media of left ear        Relevant Medications    azithromycin (Zithromax Z-Nilson) 250 MG tablet    Fever blister        Relevant Medications    acyclovir (Zovirax) 5 % ointment    Rash        Relevant Medications    acyclovir (Zovirax) 5 % ointment            ICD-10-CM ICD-9-CM   1. Seasonal allergies  J30.2 477.9   2. Other non-recurrent acute nonsuppurative otitis media of left ear  H65.192 381.00   3. Neck pain  M54.2 723.1   4. Chronic midline low back pain without sciatica  M54.50 724.2    G89.29 338.29   5. Fever blister  B00.1 054.9   6. Essential hypertension  I10 401.9   7. Rash  R21 782.1     Plan: Neuro exam is normal. Headache is most likely caused by allergies and neck pain. Start loratadine for allergies. Zpack ordered for otitis media. Refer to physical therapy for neck and low back pain. Your blood pressure is elevated. I recommend you start the amlodipine and take it every day. Acyclovir cream ordered for fever blister in the nose. Rash appears to be caused by edema in the feet. Start wearing compression hose to prevent swelling and cut back on sodium. Follow up in one month and as needed.     @Body mass index is 39.12 kg/m².                Understands disease processes and need for medications.  Understands reasons for urgent and emergent care.  Patient (& family) verbalized agreement for treatment plan.   Emotional support and active listening provided.  Patient provided time to verbalize feelings.           Class 2 Severe Obesity (BMI >=35 and <=39.9). Obesity-related health conditions include the following:  hypertension. Obesity is unchanged. BMI is is above average; BMI management plan is completed. We discussed portion control and increasing exercise.      This document has been electronically signed by MICHAEL Desai   May 12, 2023 11:52 EDT

## 2023-05-12 NOTE — TELEPHONE ENCOUNTER
Caller: Ana Lilia Leach    Relationship: Self    Best call back number: [phone]     Who are you requesting to speak with (clinical staff, provider,  specific staff member):CLINICALWhat was the call regarding: PRIOR AUTHORIZATION acyclovir (Zovirax) 5 % ointment    Do you require a callback: CALL WHEN COMPLETED

## 2023-05-18 ENCOUNTER — TELEMEDICINE (OUTPATIENT)
Dept: FAMILY MEDICINE CLINIC | Facility: CLINIC | Age: 63
End: 2023-05-18
Payer: MEDICARE

## 2023-05-18 ENCOUNTER — TELEPHONE (OUTPATIENT)
Dept: FAMILY MEDICINE CLINIC | Facility: CLINIC | Age: 63
End: 2023-05-18

## 2023-05-18 DIAGNOSIS — R68.84 JAW PAIN: Primary | ICD-10-CM

## 2023-05-18 DIAGNOSIS — H65.192 OTHER NON-RECURRENT ACUTE NONSUPPURATIVE OTITIS MEDIA OF LEFT EAR: ICD-10-CM

## 2023-05-18 DIAGNOSIS — G44.89 OTHER HEADACHE SYNDROME: ICD-10-CM

## 2023-05-18 RX ORDER — AZITHROMYCIN 250 MG/1
TABLET, FILM COATED ORAL
Qty: 6 TABLET | Refills: 0 | Status: SHIPPED | OUTPATIENT
Start: 2023-05-18 | End: 2023-05-19 | Stop reason: SDUPTHER

## 2023-05-18 NOTE — TELEPHONE ENCOUNTER
PHONE CALL FROM PATIENT.  SHE WOULD LIKE MEDICATION REDIRECTED TO     PHARMACY:  [facility]    CALL IF NEEDED

## 2023-05-18 NOTE — PROGRESS NOTES
Subjective   Ana Lilia Leach is a 62 y.o. female.     Chief Complaint   Patient presents with   • Headache   • Earache       History of Present Illness  She presents via video visit with c/o earache that started a few days ago. She states her sinuses are a lot better with the antibiotics. She states the fever blister in her nose is better. She states her left ear, left jaw, and behind her left ear is hurting. Pain is described as a dull deep nerve pain. She has an appointment with physical therapy on the 8-9th of June. She states she still has a headache but it is not as severe as it was. She denies fever and chills. She states it hurts when she chews. Pain doesn't radiate into her face. She c/o sharp shooting pain behind the left ear as well.        The following portions of the patient's history were reviewed and updated as appropriate: allergies, current medications, past family history, past medical history, past social history, past surgical history and problem list.    Review of Systems   Constitutional: Negative for fatigue and fever.   HENT: Positive for ear pain. Negative for rhinorrhea and sore throat.    Respiratory: Negative for cough, chest tightness and shortness of breath.    Cardiovascular: Negative for chest pain and palpitations.   Gastrointestinal: Negative for abdominal pain, blood in stool, constipation, diarrhea, nausea and vomiting.   Genitourinary: Negative for dysuria and hematuria.   Musculoskeletal: Positive for arthralgias, myalgias, neck pain and neck stiffness.   Allergic/Immunologic: Negative for environmental allergies.   Neurological: Positive for headaches.       Objective     There were no vitals taken for this visit.    Physical Exam  Constitutional:       General: She is not in acute distress.     Appearance: Normal appearance.   HENT:      Head: Normocephalic.   Pulmonary:      Effort: Pulmonary effort is normal. No respiratory distress.   Neurological:      Mental Status: She  is alert and oriented to person, place, and time.      Cranial Nerves: No cranial nerve deficit, dysarthria or facial asymmetry.   Psychiatric:         Mood and Affect: Mood normal.         Behavior: Behavior normal.         Current Outpatient Medications   Medication Sig Dispense Refill   • azithromycin (Zithromax Z-Nilson) 250 MG tablet Take 2 tablets the first day, then 1 tablet daily for 4 days. 6 tablet 0   • acyclovir (Zovirax) 5 % ointment Apply 1 application topically to the appropriate area as directed Every 3 (Three) Hours. 15 g 1   • amLODIPine (NORVASC) 5 MG tablet Take 1 tablet by mouth Daily. 90 tablet 1   • escitalopram (LEXAPRO) 10 MG tablet TAKE 1 TABLET EVERY DAY 90 tablet 0   • glucose blood test strip Use as instructed 100 each 12   • glucose monitor monitoring kit 1 each Daily. 1 each 0   • Lancets misc 1 Device 2 (Two) Times a Day. 100 each 12   • loratadine (Claritin) 10 MG tablet Take 1 tablet by mouth Daily. 30 tablet 5   • nadolol (CORGARD) 40 MG tablet Take 1 tablet by mouth Daily. 90 tablet 3   • pantoprazole (PROTONIX) 40 MG EC tablet Take 1 tablet by mouth Daily. 90 tablet 3     No current facility-administered medications for this visit.            Assessment & Plan     Problem List Items Addressed This Visit    None  Visit Diagnoses     Jaw pain    -  Primary    Relevant Orders    XR temporomandibular joints bilateral    Other non-recurrent acute nonsuppurative otitis media of left ear        Relevant Medications    azithromycin (Zithromax Z-Nilson) 250 MG tablet    Other headache syndrome                ICD-10-CM ICD-9-CM   1. Jaw pain  R68.84 784.92   2. Other non-recurrent acute nonsuppurative otitis media of left ear  H65.192 381.00   3. Other headache syndrome  G44.89 339.89       Plan: Get xray of tmj joints. It could be that the zpack was not enough treatment for the ear infection. Continue x 5 more days. Follow up next week in office if no better.     @There is no height or weight  on file to calculate BMI.     As a means of preventing the spread of the COVID 19, this visit was done virtually via video platform through [employer] as consented by the patient. Patient was at home and provider at [facility] [place] office during this visit. The patient consented to this telehealth visit and signed the video visit consent form. This visit included audio and video interaction. There were no technical issues that occurred during this visit.            Understands disease processes and need for medications.  Understands reasons for urgent and emergent care.  Patient (& family) verbalized agreement for treatment plan.   Emotional support and active listening provided.  Patient provided time to verbalize feelings.             This document has been electronically signed by MICHAEL Desai   May 18, 2023 14:27 EDT

## 2023-05-19 DIAGNOSIS — H65.192 OTHER NON-RECURRENT ACUTE NONSUPPURATIVE OTITIS MEDIA OF LEFT EAR: ICD-10-CM

## 2023-05-19 RX ORDER — AZITHROMYCIN 250 MG/1
TABLET, FILM COATED ORAL
Qty: 6 TABLET | Refills: 0 | Status: SHIPPED | OUTPATIENT
Start: 2023-05-19

## 2023-06-08 ENCOUNTER — HOSPITAL ENCOUNTER (OUTPATIENT)
Dept: GENERAL RADIOLOGY | Facility: HOSPITAL | Age: 63
Discharge: HOME | End: 2023-06-08
Payer: MEDICARE

## 2023-06-08 ENCOUNTER — OFFICE VISIT (OUTPATIENT)
Dept: FAMILY MEDICINE CLINIC | Facility: CLINIC | Age: 63
End: 2023-06-08
Payer: MEDICARE

## 2023-06-08 VITALS
DIASTOLIC BLOOD PRESSURE: 96 MMHG | OXYGEN SATURATION: 96 % | BODY MASS INDEX: 39.61 KG/M2 | TEMPERATURE: 97.5 F | HEIGHT: 64 IN | WEIGHT: 232 LBS | RESPIRATION RATE: 18 BRPM | HEART RATE: 96 BPM | SYSTOLIC BLOOD PRESSURE: 150 MMHG

## 2023-06-08 DIAGNOSIS — M25.562 ACUTE PAIN OF LEFT KNEE: ICD-10-CM

## 2023-06-08 DIAGNOSIS — M25.562 ACUTE PAIN OF LEFT KNEE: Primary | ICD-10-CM

## 2023-06-08 PROCEDURE — 73560 X-RAY EXAM OF KNEE 1 OR 2: CPT | Performed by: RADIOLOGY

## 2023-06-08 PROCEDURE — 73560 X-RAY EXAM OF KNEE 1 OR 2: CPT

## 2023-06-08 RX ORDER — METHYLPREDNISOLONE 4 MG/1
TABLET ORAL
Qty: 21 TABLET | Refills: 0 | Status: SHIPPED | OUTPATIENT
Start: 2023-06-08

## 2023-06-08 NOTE — PROGRESS NOTES
Patient Name: Ana Lilia Leach Today's Date: 2023   Patient MRN / CSN: [id number] / [id number] Date of Encounter: 2023   Patient Age / : 62 y.o. / 1960 Encounter Provider: MICHAEL Villafuerte   Referring Physician: No ref. provider found          Ana Lilia is a 62 y.o. female who is being seen today for Knee Pain      Knee Pain   The incident occurred 2 days ago. The incident occurred at home. The injury mechanism was a twisting injury. The pain is present in the left knee. The quality of the pain is described as aching and stabbing. The pain is moderate. The pain has been Constant since onset. Associated symptoms include an inability to bear weight and a loss of motion. She reports no foreign bodies present. The symptoms are aggravated by movement and weight bearing. She has tried nothing for the symptoms. The treatment provided no relief.     Allergies include:Penicillins, Flagyl [metronidazole], Levaquin [levofloxacin], and Aspirin  Current Outpatient Medications   Medication Sig Dispense Refill    acyclovir (Zovirax) 5 % ointment Apply 1 application topically to the appropriate area as directed Every 3 (Three) Hours. 15 g 1    amLODIPine (NORVASC) 5 MG tablet Take 1 tablet by mouth Daily. 90 tablet 1    azithromycin (Zithromax Z-Nilson) 250 MG tablet Take 2 tablets the first day, then 1 tablet daily for 4 days. 6 tablet 0    escitalopram (LEXAPRO) 10 MG tablet TAKE 1 TABLET EVERY DAY 90 tablet 0    glucose blood test strip Use as instructed 100 each 12    glucose monitor monitoring kit 1 each Daily. 1 each 0    Lancets misc 1 Device 2 (Two) Times a Day. 100 each 12    loratadine (Claritin) 10 MG tablet Take 1 tablet by mouth Daily. 30 tablet 5    nadolol (CORGARD) 40 MG tablet Take 1 tablet by mouth Daily. 90 tablet 3    pantoprazole (PROTONIX) 40 MG EC tablet Take 1 tablet by mouth Daily. 90 tablet 3    methylPREDNISolone (MEDROL) 4 MG dose pack Take as directed on package instructions. 21 tablet 0      No current facility-administered medications for this visit.     Past Medical History:   Diagnosis Date    Anxiety     Arthritis     GERD (gastroesophageal reflux disease)     Headache     Hypertension     Kidney stone     Mixed hyperlipidemia 2023    PONV (postoperative nausea and vomiting)     PVC's (premature ventricular contractions)     Thyroid disease     NODULES    Type 2 diabetes mellitus with hyperglycemia, without long-term current use of insulin 2023     Family History   Problem Relation Age of Onset    Arthritis Mother     Diabetes Mother     Cancer Mother     Hypertension Mother     Cancer Sister     Diabetes Son     Breast cancer Neg Hx      Past Surgical History:   Procedure Laterality Date    COLONOSCOPY      COLONOSCOPY N/A 2021    Procedure: COLONOSCOPY FOR SCREENING;  Surgeon: Carmel Lang MD;  Location: [facility];  Service: Gastroenterology;  Laterality: N/A;    ENDOSCOPY      ENDOSCOPY N/A 2021    Procedure: ESOPHAGOGASTRODUODENOSCOPY WITH BIOPSY;  Surgeon: Carmel Lang MD;  Location: [facility];  Service: Gastroenterology;  Laterality: N/A;    GALLBLADDER SURGERY      OVARY SURGERY Right     TUBAL ABDOMINAL LIGATION       Social History     Substance and Sexual Activity   Alcohol Use Never     Social History     Tobacco Use   Smoking Status Former    Packs/day: 1.50    Years: 15.00    Pack years: 22.50    Types: Cigarettes    Quit date: 2009    Years since quittin.2   Smokeless Tobacco Never     Social History     Substance and Sexual Activity   Drug Use Never     Review of Systems   Constitutional: Negative.    HENT: Negative.     Eyes: Negative.    Respiratory: Negative.     Cardiovascular: Negative.    Gastrointestinal: Negative.    Endocrine: Negative.    Genitourinary: Negative.    Musculoskeletal:  Positive for arthralgias and joint swelling.   Skin: Negative.    Allergic/Immunologic: Negative.    Neurological: Negative.   "  Hematological: Negative.    Psychiatric/Behavioral: Negative.        Depression Assessment Review:  PHQ-9 Total Score:    Vital Signs & Measurements Taken This Encounter  /96 (BP Location: Right arm, Patient Position: Sitting, Cuff Size: Large Adult)   Pulse 96   Temp 97.5 °F (36.4 °C) (Temporal)   Resp 18   Ht 162.6 cm (64.02\")   Wt 105 kg (232 lb)   SpO2 96%   BMI 39.80 kg/m²    SpO2 Percentage    06/08/23 0828   SpO2: 96%               Physical Exam  Constitutional:       Appearance: Normal appearance.   HENT:      Right Ear: External ear normal.      Left Ear: External ear normal.      Nose: Nose normal.      Mouth/Throat:      Mouth: Mucous membranes are moist.   Eyes:      Pupils: Pupils are equal, round, and reactive to light.   Cardiovascular:      Rate and Rhythm: Normal rate and regular rhythm.      Pulses: Normal pulses.      Heart sounds: Normal heart sounds.   Pulmonary:      Effort: Pulmonary effort is normal.      Breath sounds: Normal breath sounds.   Abdominal:      Palpations: Abdomen is soft.   Musculoskeletal:      Left knee: Bony tenderness present. Decreased range of motion. Tenderness present.   Skin:     General: Skin is warm.   Neurological:      General: No focal deficit present.      Mental Status: She is alert.   Psychiatric:         Mood and Affect: Mood normal.         Behavior: Behavior normal.        Fall Risk Assessment:  Fall Risk Assessment was completed, and patient is at moderate risk for falls.    Assessment & Plan    -- She presents today with left knee pain. She states that she was walking down a hill with her dog. She stepped with her left leg and felt a pop in her knee. There was no loss of stability. The next morning the knee was tender and swollen. She has had pain in the knee and swelling for the last 3 days. I will order an xray of the left knee as well as a medrol dospak since she cannot take NSAIDs due to kidney function.     Patient Active Problem List "   Diagnosis    Essential hypertension    Benign paroxysmal positional vertigo due to bilateral vestibular disorder    Renal stones    Mild episode of recurrent major depressive disorder    Chronic anxiety    Ureteral stone    Urinary tract infection without hematuria    Thyroid nodule    Encounter for colorectal cancer screening    GERD (gastroesophageal reflux disease)    Type 2 diabetes mellitus with hyperglycemia, without long-term current use of insulin    Mixed hyperlipidemia    Chronic midline low back pain without sciatica    Neck pain    Seasonal allergies       ICD-10-CM ICD-9-CM   1. Acute pain of left knee  M25.562 719.46     Orders Placed This Encounter   Procedures    XR Knee 1 or 2 View Left     Standing Status:   Future     Standing Expiration Date:   6/8/2024     Order Specific Question:   Reason for Exam:     Answer:   left knee injury.     Order Specific Question:   Release to patient     Answer:   Routine Release       Meds Ordered During Visit:  New Medications Ordered This Visit   Medications    methylPREDNISolone (MEDROL) 4 MG dose pack     Sig: Take as directed on package instructions.     Dispense:  21 tablet     Refill:  0       Follow up after imaging.            This document has been electronically signed by MICHAEL Villafuerte  June 8, 2023 10:21 EDT    MICHAEL Villafuerte  [address] [place] [address]  [phone] (office)    Part of this note may be an electronic transcription/translation of spoken language to printed text using the Dragon Dictation System.

## 2023-06-08 NOTE — PROGRESS NOTES
Please let her know that her xray showed no abnormailities. Please tell her to rest, ice, use compression on the knee, and to elevate when at home. If she has any questions please let me know .

## 2023-07-03 PROBLEM — M25.562 ACUTE PAIN OF LEFT KNEE: Status: ACTIVE | Noted: 2023-07-03

## 2023-08-17 RX ORDER — ESCITALOPRAM OXALATE 10 MG/1
TABLET ORAL
Qty: 90 TABLET | Refills: 0 | Status: SHIPPED | OUTPATIENT
Start: 2023-08-17

## 2023-09-17 DIAGNOSIS — E11.65 TYPE 2 DIABETES MELLITUS WITH HYPERGLYCEMIA, WITHOUT LONG-TERM CURRENT USE OF INSULIN: Chronic | ICD-10-CM

## 2023-09-18 RX ORDER — CALCIUM CITRATE/VITAMIN D3 200MG-6.25
1 TABLET ORAL DAILY
Qty: 100 EACH | Refills: 3 | Status: SHIPPED | OUTPATIENT
Start: 2023-09-18

## 2023-09-18 RX ORDER — GLUCOSAM/CHON-MSM1/C/MANG/BOSW 500-416.6
1 TABLET ORAL DAILY
Qty: 100 EACH | Refills: 3 | Status: SHIPPED | OUTPATIENT
Start: 2023-09-18

## 2024-06-10 ENCOUNTER — PATIENT OUTREACH (OUTPATIENT)
Dept: CASE MANAGEMENT | Facility: OTHER | Age: 64
End: 2024-06-10
Payer: MEDICARE

## 2024-06-10 NOTE — OUTREACH NOTE
AMBULATORY CASE MANAGEMENT NOTE    Names and Relationships of Patient/Support Persons:   Caller: Ana Lilia Leach; Relationship: Self -     Patient Outreach    [facility]    Was the enrollment attempt to reach the patient successful?: yes  Date/Time of successful contact: 6/10/2024  3:51 PM  Patient response: not interested  Comments: Patient voiced intent to establish care with a non-[facility] primary care provider.          Marilee BLISS  Ambulatory Case Management    6/10/2024, 15:52 EDT

## 2024-06-16 ENCOUNTER — HOSPITAL ENCOUNTER (EMERGENCY)
Facility: HOSPITAL | Age: 64
Discharge: HOME | End: 2024-06-16
Attending: STUDENT IN AN ORGANIZED HEALTH CARE EDUCATION/TRAINING PROGRAM | Admitting: EMERGENCY MEDICINE
Payer: MEDICARE

## 2024-06-16 ENCOUNTER — APPOINTMENT (OUTPATIENT)
Dept: CT IMAGING | Facility: HOSPITAL | Age: 64
End: 2024-06-16
Payer: MEDICARE

## 2024-06-16 ENCOUNTER — APPOINTMENT (OUTPATIENT)
Dept: GENERAL RADIOLOGY | Facility: HOSPITAL | Age: 64
End: 2024-06-16
Payer: MEDICARE

## 2024-06-16 VITALS
WEIGHT: 240 LBS | OXYGEN SATURATION: 96 % | DIASTOLIC BLOOD PRESSURE: 100 MMHG | HEIGHT: 64 IN | TEMPERATURE: 98.8 F | HEART RATE: 68 BPM | SYSTOLIC BLOOD PRESSURE: 142 MMHG | BODY MASS INDEX: 40.97 KG/M2 | RESPIRATION RATE: 20 BRPM

## 2024-06-16 DIAGNOSIS — I10 ESSENTIAL HYPERTENSION: ICD-10-CM

## 2024-06-16 DIAGNOSIS — H53.9 VISUAL DISTURBANCE: Primary | ICD-10-CM

## 2024-06-16 LAB
ALBUMIN SERPL-MCNC: 4.1 G/DL (ref 3.5–5.2)
ALBUMIN/GLOB SERPL: 1.5 G/DL
ALP SERPL-CCNC: 70 U/L (ref 39–117)
ALT SERPL W P-5'-P-CCNC: 34 U/L (ref 1–33)
ANION GAP SERPL CALCULATED.3IONS-SCNC: 12.4 MMOL/L (ref 5–15)
AST SERPL-CCNC: 33 U/L (ref 1–32)
BASOPHILS # BLD AUTO: 0.02 10*3/MM3 (ref 0–0.2)
BASOPHILS NFR BLD AUTO: 0.5 % (ref 0–1.5)
BILIRUB SERPL-MCNC: 0.3 MG/DL (ref 0–1.2)
BUN SERPL-MCNC: 9 MG/DL (ref 8–23)
BUN/CREAT SERPL: 14.5 (ref 7–25)
CALCIUM SPEC-SCNC: 9.2 MG/DL (ref 8.6–10.5)
CHLORIDE SERPL-SCNC: 101 MMOL/L (ref 98–107)
CO2 SERPL-SCNC: 24.6 MMOL/L (ref 22–29)
CREAT SERPL-MCNC: 0.62 MG/DL (ref 0.57–1)
DEPRECATED RDW RBC AUTO: 44.9 FL (ref 37–54)
EGFRCR SERPLBLD CKD-EPI 2021: 100.8 ML/MIN/1.73
EOSINOPHIL # BLD AUTO: 0.03 10*3/MM3 (ref 0–0.4)
EOSINOPHIL NFR BLD AUTO: 0.7 % (ref 0.3–6.2)
ERYTHROCYTE [DISTWIDTH] IN BLOOD BY AUTOMATED COUNT: 13.7 % (ref 12.3–15.4)
GEN 5 2HR TROPONIN T REFLEX: 9 NG/L
GLOBULIN UR ELPH-MCNC: 2.7 GM/DL
GLUCOSE SERPL-MCNC: 156 MG/DL (ref 65–99)
HCT VFR BLD AUTO: 43.3 % (ref 34–46.6)
HGB BLD-MCNC: 13.8 G/DL (ref 12–15.9)
HOLD SPECIMEN: NORMAL
HOLD SPECIMEN: NORMAL
IMM GRANULOCYTES # BLD AUTO: 0.01 10*3/MM3 (ref 0–0.05)
IMM GRANULOCYTES NFR BLD AUTO: 0.2 % (ref 0–0.5)
LYMPHOCYTES # BLD AUTO: 1.69 10*3/MM3 (ref 0.7–3.1)
LYMPHOCYTES NFR BLD AUTO: 40.4 % (ref 19.6–45.3)
MCH RBC QN AUTO: 28.5 PG (ref 26.6–33)
MCHC RBC AUTO-ENTMCNC: 31.9 G/DL (ref 31.5–35.7)
MCV RBC AUTO: 89.3 FL (ref 79–97)
MONOCYTES # BLD AUTO: 0.47 10*3/MM3 (ref 0.1–0.9)
MONOCYTES NFR BLD AUTO: 11.2 % (ref 5–12)
NEUTROPHILS NFR BLD AUTO: 1.96 10*3/MM3 (ref 1.7–7)
NEUTROPHILS NFR BLD AUTO: 47 % (ref 42.7–76)
NRBC BLD AUTO-RTO: 0 /100 WBC (ref 0–0.2)
PLATELET # BLD AUTO: 163 10*3/MM3 (ref 140–450)
PMV BLD AUTO: 10.8 FL (ref 6–12)
POTASSIUM SERPL-SCNC: 4.4 MMOL/L (ref 3.5–5.2)
PROT SERPL-MCNC: 6.8 G/DL (ref 6–8.5)
RBC # BLD AUTO: 4.85 10*6/MM3 (ref 3.77–5.28)
SODIUM SERPL-SCNC: 138 MMOL/L (ref 136–145)
TROPONIN T DELTA: -1 NG/L
TROPONIN T SERPL HS-MCNC: 10 NG/L
WBC NRBC COR # BLD AUTO: 4.18 10*3/MM3 (ref 3.4–10.8)
WHOLE BLOOD HOLD COAG: NORMAL
WHOLE BLOOD HOLD SPECIMEN: NORMAL

## 2024-06-16 PROCEDURE — 70450 CT HEAD/BRAIN W/O DYE: CPT | Performed by: RADIOLOGY

## 2024-06-16 PROCEDURE — 36415 COLL VENOUS BLD VENIPUNCTURE: CPT

## 2024-06-16 PROCEDURE — 84484 ASSAY OF TROPONIN QUANT: CPT | Performed by: STUDENT IN AN ORGANIZED HEALTH CARE EDUCATION/TRAINING PROGRAM

## 2024-06-16 PROCEDURE — 71045 X-RAY EXAM CHEST 1 VIEW: CPT

## 2024-06-16 PROCEDURE — 70450 CT HEAD/BRAIN W/O DYE: CPT

## 2024-06-16 PROCEDURE — 25010000002 DEXAMETHASONE SODIUM PHOSPHATE 10 MG/ML SOLUTION: Performed by: STUDENT IN AN ORGANIZED HEALTH CARE EDUCATION/TRAINING PROGRAM

## 2024-06-16 PROCEDURE — 25810000003 SODIUM CHLORIDE 0.9 % SOLUTION: Performed by: STUDENT IN AN ORGANIZED HEALTH CARE EDUCATION/TRAINING PROGRAM

## 2024-06-16 PROCEDURE — 85025 COMPLETE CBC W/AUTO DIFF WBC: CPT | Performed by: STUDENT IN AN ORGANIZED HEALTH CARE EDUCATION/TRAINING PROGRAM

## 2024-06-16 PROCEDURE — 96375 TX/PRO/DX INJ NEW DRUG ADDON: CPT

## 2024-06-16 PROCEDURE — 25010000002 KETOROLAC TROMETHAMINE PER 15 MG: Performed by: STUDENT IN AN ORGANIZED HEALTH CARE EDUCATION/TRAINING PROGRAM

## 2024-06-16 PROCEDURE — 80053 COMPREHEN METABOLIC PANEL: CPT | Performed by: STUDENT IN AN ORGANIZED HEALTH CARE EDUCATION/TRAINING PROGRAM

## 2024-06-16 PROCEDURE — 96374 THER/PROPH/DIAG INJ IV PUSH: CPT

## 2024-06-16 PROCEDURE — 25010000002 DIPHENHYDRAMINE PER 50 MG: Performed by: STUDENT IN AN ORGANIZED HEALTH CARE EDUCATION/TRAINING PROGRAM

## 2024-06-16 PROCEDURE — 99284 EMERGENCY DEPT VISIT MOD MDM: CPT

## 2024-06-16 PROCEDURE — 93005 ELECTROCARDIOGRAM TRACING: CPT | Performed by: STUDENT IN AN ORGANIZED HEALTH CARE EDUCATION/TRAINING PROGRAM

## 2024-06-16 PROCEDURE — 71045 X-RAY EXAM CHEST 1 VIEW: CPT | Performed by: RADIOLOGY

## 2024-06-16 RX ORDER — DEXAMETHASONE SODIUM PHOSPHATE 10 MG/ML
5 INJECTION, SOLUTION INTRAMUSCULAR; INTRAVENOUS ONCE
Status: COMPLETED | OUTPATIENT
Start: 2024-06-16 | End: 2024-06-16

## 2024-06-16 RX ORDER — SODIUM CHLORIDE 0.9 % (FLUSH) 0.9 %
10 SYRINGE (ML) INJECTION AS NEEDED
Status: DISCONTINUED | OUTPATIENT
Start: 2024-06-16 | End: 2024-06-16 | Stop reason: HOSPADM

## 2024-06-16 RX ORDER — DIPHENHYDRAMINE HYDROCHLORIDE 50 MG/ML
25 INJECTION INTRAMUSCULAR; INTRAVENOUS ONCE
Status: COMPLETED | OUTPATIENT
Start: 2024-06-16 | End: 2024-06-16

## 2024-06-16 RX ORDER — KETOROLAC TROMETHAMINE 30 MG/ML
30 INJECTION, SOLUTION INTRAMUSCULAR; INTRAVENOUS ONCE
Status: COMPLETED | OUTPATIENT
Start: 2024-06-16 | End: 2024-06-16

## 2024-06-16 RX ORDER — ACETAMINOPHEN 500 MG
1000 TABLET ORAL ONCE
Status: COMPLETED | OUTPATIENT
Start: 2024-06-16 | End: 2024-06-16

## 2024-06-16 RX ADMIN — KETOROLAC TROMETHAMINE 30 MG: 30 INJECTION, SOLUTION INTRAMUSCULAR; INTRAVENOUS at 18:01

## 2024-06-16 RX ADMIN — SODIUM CHLORIDE 1000 ML: 9 INJECTION, SOLUTION INTRAVENOUS at 18:00

## 2024-06-16 RX ADMIN — DIPHENHYDRAMINE HYDROCHLORIDE 25 MG: 50 INJECTION, SOLUTION INTRAMUSCULAR; INTRAVENOUS at 18:00

## 2024-06-16 RX ADMIN — ACETAMINOPHEN 1000 MG: 500 TABLET ORAL at 18:01

## 2024-06-16 RX ADMIN — DEXAMETHASONE SODIUM PHOSPHATE 5 MG: 10 INJECTION INTRAMUSCULAR; INTRAVENOUS at 18:01

## 2024-06-16 NOTE — ED PROVIDER NOTES
Subjective   History of Present Illness  61-year-old female past medical history of anxiety, migraines, hypertension, and diabetes presents to the ER due to concerns for possible vision changes.  Patient notes she feels like there are fixed visual changes in her vision field.  The patient describes floating type visual changes with what appears to be small specks in her vision.  Patient denies any obvious visual deficits or significant visual loss.  Patient noted symptoms appear to come and go with her headache.  Patient noted flashing lights as well.  No recent head injury or trauma.  Mild headache.  No focal neurological deficits.  Blood pressure elevated.  Otherwise vital stable        Review of Systems   Eyes:  Positive for visual disturbance.       Past Medical History:   Diagnosis Date    Anxiety     Arthritis     GERD (gastroesophageal reflux disease)     Headache     Hypertension     Kidney stone     Mixed hyperlipidemia 1/20/2023    PONV (postoperative nausea and vomiting)     PVC's (premature ventricular contractions)     Thyroid disease     NODULES    Type 2 diabetes mellitus with hyperglycemia, without long-term current use of insulin 1/20/2023       Allergies   Allergen Reactions    Penicillins Anaphylaxis    Flagyl [Metronidazole] Hives    Levaquin [Levofloxacin] Hallucinations    Aspirin Anxiety       Past Surgical History:   Procedure Laterality Date    COLONOSCOPY      COLONOSCOPY N/A 5/19/2021    Procedure: COLONOSCOPY FOR SCREENING;  Surgeon: Carmel Lang MD;  Location: [facility] OR;  Service: Gastroenterology;  Laterality: N/A;    ENDOSCOPY      ENDOSCOPY N/A 5/19/2021    Procedure: ESOPHAGOGASTRODUODENOSCOPY WITH BIOPSY;  Surgeon: Carmel Lang MD;  Location: [facility];  Service: Gastroenterology;  Laterality: N/A;    GALLBLADDER SURGERY      OVARY SURGERY Right     TUBAL ABDOMINAL LIGATION         Family History   Problem Relation Age of Onset    Arthritis Mother      Diabetes Mother     Cancer Mother     Hypertension Mother     Cancer Sister     Diabetes Son     Breast cancer Neg Hx        Social History     Socioeconomic History    Marital status:    Tobacco Use    Smoking status: Former     Current packs/day: 0.00     Average packs/day: 1.5 packs/day for 15.0 years (22.5 ttl pk-yrs)     Types: Cigarettes     Start date: 2/23/1994     Quit date: 2/23/2009     Years since quitting: 15.3    Smokeless tobacco: Never   Vaping Use    Vaping status: Never Used   Substance and Sexual Activity    Alcohol use: Never    Drug use: Never    Sexual activity: Not Currently     Partners: Female, Male           Objective   Physical Exam  Constitutional:       General: She is not in acute distress.     Appearance: Normal appearance. She is not ill-appearing.   HENT:      Head: Normocephalic and atraumatic.      Right Ear: External ear normal.      Left Ear: External ear normal.      Nose: Nose normal.      Mouth/Throat:      Mouth: Mucous membranes are moist.   Eyes:      Extraocular Movements: Extraocular movements intact.      Pupils: Pupils are equal, round, and reactive to light.   Cardiovascular:      Rate and Rhythm: Normal rate and regular rhythm.      Heart sounds: No murmur heard.  Pulmonary:      Effort: Pulmonary effort is normal. No respiratory distress.      Breath sounds: Normal breath sounds. No wheezing.   Abdominal:      General: Bowel sounds are normal.      Palpations: Abdomen is soft.      Tenderness: There is no abdominal tenderness.   Musculoskeletal:         General: No deformity or signs of injury. Normal range of motion.      Cervical back: Normal range of motion and neck supple.   Skin:     General: Skin is warm and dry.      Findings: No erythema.   Neurological:      General: No focal deficit present.      Mental Status: She is alert and oriented to person, place, and time. Mental status is at baseline.      Cranial Nerves: No cranial nerve deficit.    Psychiatric:         Mood and Affect: Mood normal.         Behavior: Behavior normal.         Thought Content: Thought content normal.         Procedures           ED Course  ED Course as of 06/16/24 2009   Sun Jun 16, 2024   1728 EKG notes sinus rhythm.  75 bpm.  No acute ST elevation.  QTc 437. [SF]   1738 Bedside ultrasound of both eyes noted no obvious concerns for retinal detachment [SF]   1842 Care of this patient was transferred to the next attending physician at shift change.  Complete discussion of presentation, labs, imaging, care, and expected course occurred during transition of providers.  Vitals stable at transfer of care. [SF]      ED Course User Index  [SF] Jonas Hadley, DO                                             Medical Decision Making  Problems Addressed:  Essential hypertension: chronic illness or injury  Visual disturbance: complicated acute illness or injury    Amount and/or Complexity of Data Reviewed  Labs: ordered.  Radiology: ordered.  ECG/medicine tests: ordered.    Risk  OTC drugs.  Prescription drug management.        Final diagnoses:   Visual disturbance   Essential hypertension       ED Disposition  ED Disposition       ED Disposition   Discharge    Condition   Stable    Comment   --               Kateryna Quispe, APRN  [address] [facility] [address] [id number]  [phone]    Schedule an appointment as soon as possible for a visit   If symptoms worsen         Medication List      No changes were made to your prescriptions during this visit.            Doug Garcia MD  06/16/24 2009

## 2024-06-17 NOTE — ED NOTES
Pt resting comfortably on stretcher. NADN at this time. Safety measures remain in place. Bed in low and locked position. Call light within reach. Pt reports no needs at this time.

## 2024-06-19 ENCOUNTER — TRANSCRIBE ORDERS (OUTPATIENT)
Dept: ADMINISTRATIVE | Facility: HOSPITAL | Age: 64
End: 2024-06-19
Payer: MEDICARE

## 2024-06-19 DIAGNOSIS — Z86.39 HX OF THYROID NODULE: Primary | ICD-10-CM

## 2024-06-19 LAB
QT INTERVAL: 392 MS
QTC INTERVAL: 437 MS

## 2024-08-02 ENCOUNTER — OFFICE VISIT (OUTPATIENT)
Dept: UROLOGY | Facility: CLINIC | Age: 64
End: 2024-08-02
Payer: MEDICARE

## 2024-08-02 VITALS
HEIGHT: 64 IN | HEART RATE: 67 BPM | SYSTOLIC BLOOD PRESSURE: 164 MMHG | DIASTOLIC BLOOD PRESSURE: 91 MMHG | WEIGHT: 236 LBS | BODY MASS INDEX: 40.29 KG/M2

## 2024-08-02 DIAGNOSIS — N20.0 LEFT RENAL STONE: Primary | ICD-10-CM

## 2024-08-02 DIAGNOSIS — Q60.0 CONGENITALLY SOLITARY LEFT KIDNEY: ICD-10-CM

## 2024-08-02 DIAGNOSIS — E11.69 HYPERLIPIDEMIA ASSOCIATED WITH TYPE 2 DIABETES MELLITUS: ICD-10-CM

## 2024-08-02 DIAGNOSIS — Z51.81 MEDICATION MONITORING ENCOUNTER: ICD-10-CM

## 2024-08-02 DIAGNOSIS — E78.5 HYPERLIPIDEMIA ASSOCIATED WITH TYPE 2 DIABETES MELLITUS: ICD-10-CM

## 2024-08-02 DIAGNOSIS — N30.00 ACUTE CYSTITIS WITHOUT HEMATURIA: ICD-10-CM

## 2024-08-02 LAB
BILIRUB BLD-MCNC: NEGATIVE MG/DL
CLARITY, POC: CLEAR
COLOR UR: YELLOW
EXPIRATION DATE: ABNORMAL
GLUCOSE UR STRIP-MCNC: NEGATIVE MG/DL
KETONES UR QL: NEGATIVE
LEUKOCYTE EST, POC: ABNORMAL
Lab: ABNORMAL
NITRITE UR-MCNC: NEGATIVE MG/ML
PH UR: 6 [PH] (ref 5–8)
PROT UR STRIP-MCNC: NEGATIVE MG/DL
RBC # UR STRIP: NEGATIVE /UL
SP GR UR: 1.01 (ref 1–1.03)
UROBILINOGEN UR QL: NORMAL

## 2024-08-02 PROCEDURE — 87086 URINE CULTURE/COLONY COUNT: CPT | Performed by: NURSE PRACTITIONER

## 2024-08-02 RX ORDER — DAPAGLIFLOZIN 5 MG/1
5 TABLET, FILM COATED ORAL
COMMUNITY
Start: 2024-07-23

## 2024-08-02 RX ORDER — OMEGA-3-ACID ETHYL ESTERS 1 G/1
1 CAPSULE, LIQUID FILLED ORAL
COMMUNITY
Start: 2024-07-23

## 2024-08-02 RX ORDER — ROSUVASTATIN CALCIUM 5 MG/1
5 TABLET, COATED ORAL
COMMUNITY
Start: 2024-07-23

## 2024-08-02 NOTE — PROGRESS NOTES
Chief Complaint  LEFT NEPHROLITHIASIS/STAGHORN/ACUTE CYSTITIS (YEARLY FOLLOW UP/EVAL MEDS)    Subjective          Ana Lilia Leach presents to [facility] for LEFT RENAL STONE/SOLITARY KIDNEY/CYSTITIS*EVAL MEDICATIONS  History of Present Illness   History of Present Illness  The patient is a pleasant 64-year-old female who returns to clinic today for evaluation for kidney stones. This is a 2-year follow-up.   She also reports concerns of new medications initiated by PCP and has questions if medications are compatible with kidney.      She has a significant history of congenitally solitary left kidney, she has a history of diabetes, hyperlipidemia, chronic back pain and MDD. The patient was last evaluated in clinic on 12/09/2021 by Dr. Abarca, has been lost to follow up until recently.  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 Katz to document the function. She does have contralateral compensat 3 hypertrophy.     She is asymptomatic on exam today and denies any flank/back pain or discomfort whatsoever.  She denies having any urinary symptoms of frequency urgency or dysuria, denies any burning with urination, or any bouts of gross hematuria.  Nevertheless, her urinalysis does show 1+ leukocyte esterase, it is negative for nitrites, it is negative for gross/microscopic hematuria.  PVR is 0 mL.      DID Review of prior imaging, CT done on 2022 showing a a 1.2 cm distal right ureteral stone. There was also a large nonobstructing left intrarenal stone/staghorn calculus measuring over 2.2 cm.  In the face of a nonfunctioning kidney,  She is asymptomatic, so surgical intervention was not recommended at that time.  She is not desirous of any surgical intervention now.  She did have recent labs completed on 06/16/2024 by PCP showing a BUN of 9, a creatinine of 0.62, with an eGFR of 100.8.  Also reviewed  her Lasix renogram study which showed Marked atrophy of the right kidney relative to the left kidney.Normal response to Lasix of the left kidney with normal renogram curve. THERE WAS No significant response to Lasix of the right kidney    On clinic evaluation today 08/02/24, OVERALL, She has concerns of medications.The patient is seeking advice on whether Farxiga, Crestor, and Omega-3 are safe for managing her single kidney. Her primary care physician, Keturah jacobsen Pant[place], expressed concern about her condition and advised her to consult her urologist. The patient believes her A1c level was elevated, possibly around 6 or 7.     Patient reports that, she has made dietary modifications and reduced her sodium intake, which has improved her swelling and redness in her feet. She continues to experience the kidney stone, but denies any associated pain, urinary problems, infections, or dysuria. She experiences urinary incontinence when coughing or sneezing, necessitating the use of BP pads. She reports nocturia, waking up 2 to 3 times per night to urinate, which she attributes to high water intake.     Her sleep is disrupted due to her home situation, as she is currently sleeping on a couch. She cares for her 41-year-old son, who is autistic, who lives in a one-bedroom apartment. Over the past 2 weeks, she has lost 6 pounds through dietary modifications.     She underwent a nuclear medicine test and Lasix renewal at [facility] 2 years ago. She is unable to lie flat without experiencing vertigo. She last saw Dr. Katz on 12/09/2021. Dr. Katz believes her right kidney is nonfunctioning due to the stone. She denies any urinary symptoms or hematuria.    IMPRESSION: 2022  1. 1.22 cm distal right ureteral stone.  2. Large nonobstructing left intrarenal stones.  3. Other findings as above.    IMPRESSION:LASIX RENOGRAM 2021   1. Marked atrophy of the right kidney relative to the left kidney.  2. Right kidney contributes 90% to  "overall renal function and left kidney contributes 91%.  3. Normal response to Lasix of the left kidney with normal renogram curve.  4. No significant response to Lasix of the right kidney    Active Ambulatory Problems     Diagnosis Date Noted    Essential hypertension 02/23/2021    Benign paroxysmal positional vertigo due to bilateral vestibular disorder 02/23/2021    Left renal stone 02/23/2021    Mild episode of recurrent major depressive disorder 02/23/2021    Chronic anxiety 02/23/2021    Ureteral stone 02/24/2021    Urinary tract infection without hematuria 02/24/2021    Thyroid nodule 04/26/2021    Medication monitoring encounter 05/11/2021    GERD (gastroesophageal reflux disease)     Type 2 diabetes mellitus with hyperglycemia, without long-term current use of insulin 01/20/2023    Mixed hyperlipidemia 01/20/2023    Chronic midline low back pain without sciatica 05/12/2023    Neck pain 05/12/2023    Seasonal allergies 05/12/2023    Acute pain of left knee 07/03/2023    Congenitally solitary left kidney 08/02/2024     Resolved Ambulatory Problems     Diagnosis Date Noted    Current episode of major depressive disorder without prior episode 02/23/2021    Anxiety attack 02/23/2021     Past Medical History:   Diagnosis Date    Anxiety     Arthritis     Headache     Hypertension     Kidney stone     PONV (postoperative nausea and vomiting)     PVC's (premature ventricular contractions)     Thyroid disease       Objective   Vital Signs:   /91 (BP Location: Right arm, Patient Position: Sitting)   Pulse 67   Ht 162.6 cm (64.02\")   Wt 107 kg (236 lb)   BMI 40.49 kg/m²       ROS:   Review of Systems   Constitutional:  Positive for activity change, appetite change, fatigue and unexpected weight gain. Negative for chills, diaphoresis, fever and unexpected weight loss.   HENT: Negative.  Negative for congestion, ear discharge, ear pain, nosebleeds, rhinorrhea, sinus pressure and sore throat.    Eyes: Negative. "  Negative for blurred vision, double vision, photophobia, pain, redness and visual disturbance.   Respiratory:  Negative for apnea, cough, chest tightness, shortness of breath, wheezing and stridor.    Cardiovascular:  Negative for chest pain, palpitations and leg swelling.   Gastrointestinal:  Positive for abdominal distention and abdominal pain. Negative for constipation, diarrhea, nausea and vomiting.   Endocrine: Negative.  Negative for polydipsia, polyphagia and polyuria.   Genitourinary:  Positive for flank pain, frequency, pelvic pain, pelvic pressure, urgency and urinary incontinence. Negative for decreased urine volume, difficulty urinating, dyspareunia, dysuria, genital sores, hematuria and vaginal discharge.   Musculoskeletal:  Negative for arthralgias, back pain and joint swelling.   Skin: Negative.  Negative for pallor, rash and wound.   Allergic/Immunologic: Negative.    Neurological:  Positive for headache. Negative for dizziness, tremors, syncope, weakness, light-headedness, memory problem and confusion.   Hematological: Negative.    Psychiatric/Behavioral: Negative.  Negative for behavioral problems and decreased concentration.         Physical Exam  Constitutional:       General: She is in acute distress.      Appearance: She is well-developed. She is obese. She is ill-appearing.   HENT:      Head: Normocephalic and atraumatic.   Eyes:      Pupils: Pupils are equal, round, and reactive to light.   Neck:      Thyroid: No thyromegaly.      Trachea: No tracheal deviation.   Cardiovascular:      Rate and Rhythm: Normal rate and regular rhythm.      Heart sounds: No murmur heard.  Pulmonary:      Effort: Pulmonary effort is normal. No respiratory distress.      Breath sounds: Normal breath sounds. No stridor. No wheezing.   Abdominal:      General: Bowel sounds are normal. There is distension.      Palpations: Abdomen is soft.      Tenderness: There is abdominal tenderness.   Genitourinary:     Labia:          Right: No tenderness.         Left: No tenderness.       Vagina: Normal. No vaginal discharge.   Musculoskeletal:         General: Tenderness present. No deformity. Normal range of motion.      Cervical back: Normal range of motion.   Skin:     General: Skin is warm and dry.      Capillary Refill: Capillary refill takes less than 2 seconds.      Coloration: Skin is not pale.      Findings: No erythema or rash.   Neurological:      Mental Status: She is alert and oriented to person, place, and time.      Cranial Nerves: No cranial nerve deficit.      Sensory: No sensory deficit.      Coordination: Coordination normal.   Psychiatric:         Behavior: Behavior normal.         Thought Content: Thought content normal.         Judgment: Judgment normal.        Physical Exam    Result Review :     UA          8/2/2024    11:01   Urinalysis   Ketones, UA Negative    Leukocytes, UA Trace               Assessment and Plan    Problem List Items Addressed This Visit          Genitourinary and Reproductive     Left renal stone - Primary    Relevant Orders    CT Abdomen Pelvis Without Contrast    Comprehensive Metabolic Panel    Lipid Panel    Urinary tract infection without hematuria    Overview     Added automatically from request for surgery [id number]         Relevant Orders    CT Abdomen Pelvis Without Contrast    Comprehensive Metabolic Panel    Urine Culture - Urine, Urine, Random Void    POC Urinalysis Dipstick, Automated (Completed)    Lipid Panel       Health Encounters    Medication monitoring encounter    Overview     Added automatically from request for surgery [id number]         Relevant Orders    Lipid Panel       Other    Congenitally solitary left kidney    Relevant Orders    CT Abdomen Pelvis Without Contrast    Comprehensive Metabolic Panel    Lipid Panel     Other Visit Diagnoses       Hyperlipidemia associated with type 2 diabetes mellitus        Relevant Medications    omega-3 acid ethyl esters (LOVAZA) 1 g  capsule    Farxiga 5 MG tablet tablet    rosuvastatin (CRESTOR) 5 MG tablet    Other Relevant Orders    Lipid Panel          Assessment & Plan               ASSESSMENT  SOLITARY LEFT KIDNEY WITH 2.2 CM STAGHORN CALCULUS/ACUTE CYSTITIS/MEDS EVAL  MS JONATHAN CERVANTES IS A is a pleasant 64-year-old female who returns to clinic today for evaluation for kidney stones. She also reports concerns of new medications initiated by PCP and has questions if medications are compatible with kidney: Farxiga, Crestor, omega-3.        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 Katz to document the function. She does have contralateral compensat hypertrophy. She is asymptomatic on exam today and denies any flank/back pain or discomfort whatsoever.  She denies having any urinary symptoms of frequency urgency or dysuria, denies any burning with urination, or any bouts of gross hematuria.  Nevertheless, her urinalysis does show 1+ leukocyte esterase, it is negative for nitrites, it is negative for gross/microscopic hematuria.  PVR is 0 mL.     I discussed the fact that her medication is relatively safe but will keep monitor on her renal function testing.      First we discussed her Farxiga.  Explained to patient that, for adults with chronic kidney disease (CKD), FARXIGA is a prescription medicine approved to reduce the risk of further worsening of kidney disease, end-stage kidney disease, death due to cardiovascular disease, and hospitalization for heart failure.    Next, WE DISCUSSED Rosuvastatin/CRESTOR AS A statin class of drugs indicated to lower high cholesterol. Lower cholesterol in the blood can prevent heart attacks, strokes, and can even reduce the risk of heart disease fatality by 25%-35%. However, also explained to patient that higher doses of rosuvastatin may have lasting and damaging effects on the kidneys, according to a study published in [employer] based on  patient health records. Functioning kidneys balance water and minerals in the blood and remove waste from blood. Damage to this organ creates dysbiosis because of waste and fluid buildup in the body. Common symptoms of damage include shortness of breath, swollen ankles, nausea, weakness, and poor sleep, but too much damage could cause life-threatening kidney failure. Other signs of kidney damage include hematuria and proteinuria, which are going to be monitored closely.     FINALLY, Omega-3 fatty acids, specifically eicosapentaenoic acid (EPA) and docosahexaenoic acid (DHA), have been found to exert strong anti-inflammatory properties. Chronic kidney disease is an inflammatory disorder. High levels of inflammatory markers have been reported in patients with CKD. Omega-3 fatty acids (n?3FA) have the capacity to dampen this inflammation, thereby potentially reducing injury to the kidneys and slowing the progression of the disease.    Another vital benefit of omega-3 fatty acids in kidney disease is their ability to impact lipid profiles positively. A common issue associated with CKD is dyslipidemia, an abnormal amount of lipids (e.g., cholesterol, triglycerides) in the blood. This condition can lead to atherosclerosis, which may further deteriorate kidney function. Studies have shown that omega-3s can improve lipid profiles, reducing levels of harmful lipids and thereby potentially protecting against kidney function decline.    FINALLY, DISCUSSED HER LEFT STAGHORN STONE -DISCUSSED REFERRAL FOR PCNL IF SYMPTOMATIC-DEFERRED PER PT, WILL CONTINUE TO MONITOR        PLAN  Patient advised to continue medications as prescribed by PCP    We did send her urine out for culture today, will call results if any positive infections.    Discussed only treating UTIs with positive urine culture    Will recheck CMP/lipid panel in 3 months    We discussed continuing lifestyle modifications with diet and exercise as tolerated.  Also  decreasing her stressors, also diabetic education with diet and medications.     Also recommend upper tract investigation with CT abdomen and pelvis without contrast -CT SCAN TO BE scheduled in 3 months.      We discussed OTHER treatment options for HER BACK/FLANK pain with patient encouraged to continue conservative therapy alternating NSAID/Tylenol as tolerated, Also including hot baths, showers, warm compresses to lower back as tolerated. Also encouraged walking, physical therapy, light exercises as tolerated    Rest is the most important treatment in the case of BACK/FLANK pain. Rest and physical therapy are enough to improve minor pain. Discussed to monitor his daily routine with prevention of flank pain by: At least Drinking 8 glasses of water per day, Limiting your alcohol consumption.  Having a healthy diet containing fruits, vegetables, and a lot of fluids, Practicing safe sex.  Also maintaining proper hygiene of your body as well as the environment.    Discussed a kidney stone prevention diet to include increasing p.o. fluid intake, to at least 1 to 2 L of water daily.  She is to avoid caffeine products such as cola, coffee, and to avoid soft or soda drinks.  She is to decrease her sodium consumption as in  Fast foods, smith, salted nuts, canned foods, and smoked/cured foods. She is also to decrease her oxalate consumption, as in spinach, Roberta greens, and Rhubarb.  Also important is to decrease protein intake, as in red meats, peanut butter, and also avoid nuts.    FOLLOW UP WITH PCP if any concerns-medication    Return to clinic sooner if need be     Patient is Agreeable  WITH plan of care.     1. Kidney stones/medication Evaluation-summary.  The patient was advised to commence the prescribed medications prescribed by her primary care physician. A follow-up appointment in the clinic is scheduled for 3 months from now, during which a CT scan of the abdomen and pelvis without contrast will be performed  to reassess her kidney function. Lifestyle modifications, inclusive of her diabetes and weight control, were discussed. A repeat CMP/lipid panel will also be conducted at that time. The patient concurs with the proposed plan of care.    Follow-up  A follow-up appointment is scheduled for 3 months from now.    Patient reports that she is not currently experiencing any symptoms of urinary incontinence.    Class 3 Severe Obesity (BMI >=40). Obesity-related health conditions include the following: obstructive sleep apnea, hypertension, coronary heart disease, diabetes mellitus, dyslipidemias, and GERD. Obesity is improving with lifestyle modifications. BMI is  40.49 kg . We discussed portion control and increasing exercise.      RADIOLOGY (CT AND/OR KUB):    CT Abdomen and Pelvis: No results found for this or any previous visit.     CT Stone Protocol: No results found for this or any previous visit.     KUB: Results for orders placed during the hospital encounter of 09/15/22    XR Abdomen KUB    Narrative  EXAM:  XR Abdomen, 1 View    EXAM DATE:  9/15/2022 3:21 PM    CLINICAL HISTORY:  right lower quadrant; R10.31-Right lower quadrant pain    TECHNIQUE:  Frontal supine view of the abdomen/pelvis.    COMPARISON:  02/24/2021    FINDINGS:  Gastrointestinal tract:  Unremarkable.  No dilation.  Organs:  Stable nonobstructing left kidney stones.  No stones  identified along the expected course of ureters.  Bones/joints:  Unremarkable.  Soft tissues:  Surgical clips right upper quadrant.    Impression  Stable left kidney stones.    This report was finalized on 9/16/2022 8:38 AM by Dr. Andi Painting MD.       [unfilled]  LABS (3 MONTHS):    Office Visit on 08/02/2024   Component Date Value Ref Range Status    Color 08/02/2024 Yellow  Yellow, Straw, Dark Yellow, Georgina Final    Clarity, UA 08/02/2024 Clear  Clear Final    Specific Gravity  08/02/2024 1.015  1.005 - 1.030 Final    pH, Urine 08/02/2024 6.0  5.0 - 8.0 Final     Leukocytes 08/02/2024 Trace (A)  Negative Final    Nitrite, UA 08/02/2024 Negative  Negative Final    Protein, POC 08/02/2024 Negative  Negative mg/dL Final    Glucose, UA 08/02/2024 Negative  Negative mg/dL Final    Ketones, UA 08/02/2024 Negative  Negative Final    Urobilinogen, UA 08/02/2024 Normal  Normal, 0.2 E.U./dL Final    Bilirubin 08/02/2024 Negative  Negative Final    Blood, UA 08/02/2024 Negative  Negative Final    Lot Number 08/02/2024 [id number]   Final    Expiration Date 08/02/2024 102,024   Final   Admission on 06/16/2024, Discharged on 06/16/2024   Component Date Value Ref Range Status    QT Interval 06/16/2024 392  ms Final    QTC Interval 06/16/2024 437  ms Final    Glucose 06/16/2024 156 (H)  65 - 99 mg/dL Final    BUN 06/16/2024 9  8 - 23 mg/dL Final    Creatinine 06/16/2024 0.62  0.57 - 1.00 mg/dL Final    Sodium 06/16/2024 138  136 - 145 mmol/L Final    Potassium 06/16/2024 4.4  3.5 - 5.2 mmol/L Final    Slight hemolysis detected by analyzer. Result may be falsely elevated.    Chloride 06/16/2024 101  98 - 107 mmol/L Final    CO2 06/16/2024 24.6  22.0 - 29.0 mmol/L Final    Calcium 06/16/2024 9.2  8.6 - 10.5 mg/dL Final    Total Protein 06/16/2024 6.8  6.0 - 8.5 g/dL Final    Albumin 06/16/2024 4.1  3.5 - 5.2 g/dL Final    ALT (SGPT) 06/16/2024 34 (H)  1 - 33 U/L Final    AST (SGOT) 06/16/2024 33 (H)  1 - 32 U/L Final    Alkaline Phosphatase 06/16/2024 70  39 - 117 U/L Final    Total Bilirubin 06/16/2024 0.3  0.0 - 1.2 mg/dL Final    Globulin 06/16/2024 2.7  gm/dL Final    A/G Ratio 06/16/2024 1.5  g/dL Final    BUN/Creatinine Ratio 06/16/2024 14.5  7.0 - 25.0 Final    Anion Gap 06/16/2024 12.4  5.0 - 15.0 mmol/L Final    eGFR 06/16/2024 100.8  >60.0 mL/min/1.73 Final    HS Troponin T 06/16/2024 10  <14 ng/L Final    Extra Tube 06/16/2024 Hold for add-ons.   Final    Auto resulted.    Extra Tube 06/16/2024 hold for add-on   Final    Auto resulted    Extra Tube 06/16/2024 Hold for add-ons.    Final    Auto resulted.    Extra Tube 06/16/2024 Hold for add-ons.   Final    Auto resulted    WBC 06/16/2024 4.18  3.40 - 10.80 10*3/mm3 Final    RBC 06/16/2024 4.85  3.77 - 5.28 10*6/mm3 Final    Hemoglobin 06/16/2024 13.8  12.0 - 15.9 g/dL Final    Hematocrit 06/16/2024 43.3  34.0 - 46.6 % Final    MCV 06/16/2024 89.3  79.0 - 97.0 fL Final    MCH 06/16/2024 28.5  26.6 - 33.0 pg Final    MCHC 06/16/2024 31.9  31.5 - 35.7 g/dL Final    RDW 06/16/2024 13.7  12.3 - 15.4 % Final    RDW-SD 06/16/2024 44.9  37.0 - 54.0 fl Final    MPV 06/16/2024 10.8  6.0 - 12.0 fL Final    Platelets 06/16/2024 163  140 - 450 10*3/mm3 Final    Neutrophil % 06/16/2024 47.0  42.7 - 76.0 % Final    Lymphocyte % 06/16/2024 40.4  19.6 - 45.3 % Final    Monocyte % 06/16/2024 11.2  5.0 - 12.0 % Final    Eosinophil % 06/16/2024 0.7  0.3 - 6.2 % Final    Basophil % 06/16/2024 0.5  0.0 - 1.5 % Final    Immature Grans % 06/16/2024 0.2  0.0 - 0.5 % Final    Neutrophils, Absolute 06/16/2024 1.96  1.70 - 7.00 10*3/mm3 Final    Lymphocytes, Absolute 06/16/2024 1.69  0.70 - 3.10 10*3/mm3 Final    Monocytes, Absolute 06/16/2024 0.47  0.10 - 0.90 10*3/mm3 Final    Eosinophils, Absolute 06/16/2024 0.03  0.00 - 0.40 10*3/mm3 Final    Basophils, Absolute 06/16/2024 0.02  0.00 - 0.20 10*3/mm3 Final    Immature Grans, Absolute 06/16/2024 0.01  0.00 - 0.05 10*3/mm3 Final    nRBC 06/16/2024 0.0  0.0 - 0.2 /100 WBC Final    HS Troponin T 06/16/2024 9  <14 ng/L Final    Troponin T Delta 06/16/2024 -1  >=-4 - <+4 ng/L Final        Follow Up   Return in about 3 months (around 11/4/2024) for Next scheduled follow up FOR LEFT RENAL STONE/SOLITARY KIDNEY/EVAL CT/CMP/MEDS EVALUATION.    Patient was given instructions and counseling regarding her condition or for health maintenance advice. Please see specific information pulled into the AVS if appropriate.          This document has been electronically signed by Griselda Cheng-Akwa, APRN   August 2, 2024 14:09  EDT      Dictated Utilizing Dragon Dictation: Part of this note may be an electronic transcription/translation of spoken language to printed text using the Dragon Dictation System.      Patient or patient representative verbalized consent for the use of Ambient Listening during the visit with  Griselda Cheng-Akwa, APRN for chart documentation. 8/2/2024  14:08 EDT

## 2024-08-04 LAB — BACTERIA SPEC AEROBE CULT: NO GROWTH

## 2024-08-06 ENCOUNTER — TELEPHONE (OUTPATIENT)
Dept: UROLOGY | Facility: CLINIC | Age: 64
End: 2024-08-06
Payer: MEDICARE

## 2024-08-06 NOTE — TELEPHONE ENCOUNTER
I called pt with negative urine culture pt verbalized understanding.    ----- Message from Griselda Cheng-Akwa sent at 8/6/2024 12:00 AM EDT -----  Please kindly let patient know her urine culture results are Negative for any bacteria infection at this time    Urine Culture - No growth    However, She may drop off another urine dip if she feels symptomatic.     CONTINUE ANTIBIOTIC SUPPRESSIVE THERAPY ONLY IF INDICATED.    If not INCREASE PO FLUIDS AND FOLLOW UP IN CLINIC S DISCUSSED.    THANK YOU

## 2024-10-07 ENCOUNTER — OFFICE VISIT (OUTPATIENT)
Dept: CARDIOLOGY | Facility: CLINIC | Age: 64
End: 2024-10-07
Payer: MEDICARE

## 2024-10-07 VITALS
SYSTOLIC BLOOD PRESSURE: 152 MMHG | DIASTOLIC BLOOD PRESSURE: 70 MMHG | BODY MASS INDEX: 40.05 KG/M2 | RESPIRATION RATE: 16 BRPM | OXYGEN SATURATION: 96 % | HEIGHT: 64 IN | WEIGHT: 234.6 LBS | HEART RATE: 59 BPM

## 2024-10-07 DIAGNOSIS — R06.09 DYSPNEA ON EXERTION: Primary | ICD-10-CM

## 2024-10-07 DIAGNOSIS — R00.1 BRADYCARDIA, SINUS: ICD-10-CM

## 2024-10-07 DIAGNOSIS — I10 ESSENTIAL HYPERTENSION: ICD-10-CM

## 2024-10-07 PROCEDURE — 3077F SYST BP >= 140 MM HG: CPT | Performed by: INTERNAL MEDICINE

## 2024-10-07 PROCEDURE — 3078F DIAST BP <80 MM HG: CPT | Performed by: INTERNAL MEDICINE

## 2024-10-07 PROCEDURE — 99204 OFFICE O/P NEW MOD 45 MIN: CPT | Performed by: INTERNAL MEDICINE

## 2024-10-07 PROCEDURE — 93000 ELECTROCARDIOGRAM COMPLETE: CPT | Performed by: INTERNAL MEDICINE

## 2024-10-07 RX ORDER — LOSARTAN POTASSIUM 50 MG/1
50 TABLET ORAL DAILY
Qty: 30 TABLET | Refills: 3 | Status: SHIPPED | OUTPATIENT
Start: 2024-10-07

## 2024-10-07 NOTE — PROGRESS NOTES
Keturah Berry APRN  Ana Lilia Leach  1960  10/07/2024    Patient Active Problem List   Diagnosis    Essential hypertension    Benign paroxysmal positional vertigo due to bilateral vestibular disorder    Left renal stone    Mild episode of recurrent major depressive disorder    Chronic anxiety    Ureteral stone    Urinary tract infection without hematuria    Thyroid nodule    Medication monitoring encounter    GERD (gastroesophageal reflux disease)    Type 2 diabetes mellitus with hyperglycemia, without long-term current use of insulin    Mixed hyperlipidemia    Chronic midline low back pain without sciatica    Neck pain    Seasonal allergies    Acute pain of left knee    Congenitally solitary left kidney       Dear Keturah:    Subjective     Ana Lilia Leach is a 64 y.o. female with the problems as listed above, presents    Chief complaint: History of PVCs and some shortness of breath.    History of Present Illness: Ms. Ana Lilia Leach is a pleasant 61-year-old  female with no history of known heart disease or coronary artery disease, has type 2 diabetes mellitus, systemic hypertension and history of kidney stones, presents with history of having PVCs and some dyspnea with exertion.  On further questioning she denies any chest pain or discomfort.  She states that she has had PVCs for a long time.  She also has had vertigo for a long time and was reportedly diagnosed to have benign positional vertigo.  She denies any significant palpitations, dizziness or syncope.  She has previous history of smoking of about a pack and a half a day for about 15 years but quit in 2009.  She states her blood pressure at home still runs around 140s on the top and 70s to 80s on the bottom.    Allergies   Allergen Reactions    Penicillins Anaphylaxis    Flagyl [Metronidazole] Hives    Levaquin [Levofloxacin] Hallucinations    Aspirin Anxiety   :    Current Outpatient Medications:     escitalopram (LEXAPRO) 10 MG  tablet, TAKE 1 TABLET EVERY DAY, Disp: 90 tablet, Rfl: 0    nadolol (CORGARD) 40 MG tablet, Take 1 tablet by mouth Daily., Disp: 90 tablet, Rfl: 3    glucose blood (True Metrix Blood Glucose Test) test strip, 1 each by Other route Daily., Disp: 100 each, Rfl: 3    glucose monitor monitoring kit, 1 each Daily., Disp: 1 each, Rfl: 0    losartan (Cozaar) 50 MG tablet, Take 1 tablet by mouth Daily., Disp: 30 tablet, Rfl: 3    TRUEplus Lancets 33G misc, 1 each by Other route Daily., Disp: 100 each, Rfl: 3    Past Medical History:   Diagnosis Date    Anxiety     Arthritis     GERD (gastroesophageal reflux disease)     Headache     Hypertension     Kidney stone     Mixed hyperlipidemia 1/20/2023    PONV (postoperative nausea and vomiting)     PVC's (premature ventricular contractions)     Thyroid disease     NODULES    Type 2 diabetes mellitus with hyperglycemia, without long-term current use of insulin 1/20/2023     Past Surgical History:   Procedure Laterality Date    COLONOSCOPY      COLONOSCOPY N/A 5/19/2021    Procedure: COLONOSCOPY FOR SCREENING;  Surgeon: Carmel Lang MD;  Location: [facility];  Service: Gastroenterology;  Laterality: N/A;    ENDOSCOPY      ENDOSCOPY N/A 5/19/2021    Procedure: ESOPHAGOGASTRODUODENOSCOPY WITH BIOPSY;  Surgeon: Carmel Lang MD;  Location: [facility];  Service: Gastroenterology;  Laterality: N/A;    GALLBLADDER SURGERY      OVARY SURGERY Right     TUBAL ABDOMINAL LIGATION       Family History   Problem Relation Age of Onset    Heart failure Mother     Heart disease Mother     Arthritis Mother     Diabetes Mother     Cancer Mother     Hypertension Mother     Heart disease Sister     Cancer Sister     Heart disease Brother     Diabetes Son     Breast cancer Neg Hx      Social History     Tobacco Use    Smoking status: Former     Current packs/day: 0.00     Average packs/day: 1.5 packs/day for 15.0 years (22.5 ttl pk-yrs)     Types: Cigarettes     Start date:  "2/23/1994     Quit date: 2/23/2009     Years since quitting: 15.6    Smokeless tobacco: Never   Vaping Use    Vaping status: Never Used   Substance Use Topics    Alcohol use: Never    Drug use: Never     Review of Systems   Constitutional: Positive for malaise/fatigue. Negative for chills, diaphoresis and fever.   Eyes:  Positive for visual disturbance.   Cardiovascular:  Positive for leg swelling. Negative for chest pain, orthopnea, palpitations and paroxysmal nocturnal dyspnea.   Respiratory:  Positive for shortness of breath. Negative for cough and hemoptysis.    Endocrine: Negative for cold intolerance and heat intolerance.   Hematologic/Lymphatic: Does not bruise/bleed easily.   Skin:  Negative for rash.   Musculoskeletal:  Positive for back pain and joint pain. Negative for myalgias.   Gastrointestinal:  Positive for abdominal pain, change in bowel habit, constipation, diarrhea and heartburn. Negative for nausea and vomiting.   Genitourinary:  Positive for bladder incontinence and dysuria. Negative for hematuria.   Neurological:  Positive for dizziness, headaches, numbness and paresthesias. Negative for focal weakness.     Objective   Blood pressure 152/70, pulse 59, resp. rate 16, height 162.6 cm (64\"), weight 106 kg (234 lb 9.6 oz), SpO2 96%.  Body mass index is 40.27 kg/m².    Vitals reviewed.   Constitutional:       Appearance: Well-developed.   Eyes:      Conjunctiva/sclera: Conjunctivae normal.   HENT:      Head: Normocephalic.   Neck:      Thyroid: No thyromegaly.      Vascular: No JVD.      Trachea: No tracheal deviation.   Pulmonary:      Effort: No respiratory distress.      Breath sounds: Normal breath sounds. No wheezing. No rales.   Cardiovascular:      PMI at left midclavicular line. Normal rate. Regular rhythm. Normal S1. Normal S2.       Murmurs: There is no murmur.      No gallop.  No click. No rub.   Pulses:     Intact distal pulses.   Edema:     Peripheral edema absent.   Abdominal:      " General: Bowel sounds are normal.      Palpations: Abdomen is soft. There is no abdominal mass.      Tenderness: There is no abdominal tenderness.   Musculoskeletal:      Cervical back: Normal range of motion and neck supple. Skin:     General: Skin is warm and dry.   Neurological:      Mental Status: Alert and oriented to person, place, and time.      Cranial Nerves: No cranial nerve deficit.       Lab Results   Component Value Date     06/16/2024    K 4.4 06/16/2024     06/16/2024    CO2 24.6 06/16/2024    BUN 9 06/16/2024    CREATININE 0.62 06/16/2024    GLUCOSE 156 (H) 06/16/2024    CALCIUM 9.2 06/16/2024    AST 33 (H) 06/16/2024    ALT 34 (H) 06/16/2024    ALKPHOS 70 06/16/2024    LABIL2 2.1 02/14/2023       Lab Results   Component Value Date    WBC 4.18 06/16/2024    HGB 13.8 06/16/2024    HCT 43.3 06/16/2024     06/16/2024       Lab Results   Component Value Date    TSH 1.070 09/08/2022    CHLPL 184 02/14/2023    TRIG 282 (H) 02/14/2023    HDL 36 (L) 02/14/2023     02/14/2023        ECG 12 Lead    Date/Time: 10/7/2024 11:27 AM  Performed by: Song West MD    Authorized by: Song West MD  Comparison: compared with previous ECG from 6/16/2024  Similar to previous ECG  Rhythm: sinus bradycardia  BPM: 58  Conduction: conduction normal  ST Segments: ST segments normal  T Waves: T waves normal            Assessment & Plan    Diagnosis Plan   1. Dyspnea on exertion        2. Essential hypertension  Basic Metabolic Panel      3. Bradycardia, sinus            Recommendations  Orders Placed This Encounter   Procedures    Basic Metabolic Panel    ECG 12 Lead      Will obtain echo Doppler study to evaluate her ventricular valve function and make further recommendations accordingly.  Add losartan 50 mg daily for her elevated blood pressures.  Check BMP in a week    Return in about 2 months (around 12/7/2024) for or sooner if needed.    As always, Keturah Berry, APRN  I  appreciate very much the opportunity to participate in the cardiovascular care of your patients. Please do not hesitate to call me with any questions with regards to Ana Lilia Leach's evaluation and management.     With Best Regards,        Song West MD, FACC    Please note that portions of this note were completed with a voice recognition program.

## 2024-10-07 NOTE — LETTER
October 7, 2024     MICHAEL Hawkins  [address] [facility] [address]    Patient: Ana Lilia Leach   YOB: 1960   Date of Visit: 10/7/2024     Dear MICHAEL Hawkins:       Thank you for referring Ana Lilia Leach to me for evaluation. Below are the relevant portions of my assessment and plan of care.    If you have questions, please do not hesitate to call me. I look forward to following Ana Lilia along with you.         Sincerely,        Song West MD        CC: No Recipients    Song West MD  10/07/24 1804  Sign when Signing Visit  Keturah Berry APRN  Ana Lilia Leach  1960  10/07/2024    Patient Active Problem List   Diagnosis   • Essential hypertension   • Benign paroxysmal positional vertigo due to bilateral vestibular disorder   • Left renal stone   • Mild episode of recurrent major depressive disorder   • Chronic anxiety   • Ureteral stone   • Urinary tract infection without hematuria   • Thyroid nodule   • Medication monitoring encounter   • GERD (gastroesophageal reflux disease)   • Type 2 diabetes mellitus with hyperglycemia, without long-term current use of insulin   • Mixed hyperlipidemia   • Chronic midline low back pain without sciatica   • Neck pain   • Seasonal allergies   • Acute pain of left knee   • Congenitally solitary left kidney       Dear Keturah:    Subjective    Ana Lilia Leach is a 64 y.o. female with the problems as listed above, presents    Chief complaint: History of PVCs and some shortness of breath.    History of Present Illness: Ms. Ana Lilia Leach is a pleasant 61-year-old  female with no history of known heart disease or coronary artery disease, has type 2 diabetes mellitus, systemic hypertension and history of kidney stones, presents with history of having PVCs and some dyspnea with exertion.  On further questioning she denies any chest pain or discomfort.  She states that she has had PVCs for a long time.  She also  has had vertigo for a long time and was reportedly diagnosed to have benign positional vertigo.  She denies any significant palpitations, dizziness or syncope.  She has previous history of smoking of about a pack and a half a day for about 15 years but quit in 2009.  She states her blood pressure at home still runs around 140s on the top and 70s to 80s on the bottom.    Allergies   Allergen Reactions   • Penicillins Anaphylaxis   • Flagyl [Metronidazole] Hives   • Levaquin [Levofloxacin] Hallucinations   • Aspirin Anxiety   :    Current Outpatient Medications:   •  escitalopram (LEXAPRO) 10 MG tablet, TAKE 1 TABLET EVERY DAY, Disp: 90 tablet, Rfl: 0  •  nadolol (CORGARD) 40 MG tablet, Take 1 tablet by mouth Daily., Disp: 90 tablet, Rfl: 3  •  glucose blood (True Metrix Blood Glucose Test) test strip, 1 each by Other route Daily., Disp: 100 each, Rfl: 3  •  glucose monitor monitoring kit, 1 each Daily., Disp: 1 each, Rfl: 0  •  losartan (Cozaar) 50 MG tablet, Take 1 tablet by mouth Daily., Disp: 30 tablet, Rfl: 3  •  TRUEplus Lancets 33G misc, 1 each by Other route Daily., Disp: 100 each, Rfl: 3    Past Medical History:   Diagnosis Date   • Anxiety    • Arthritis    • GERD (gastroesophageal reflux disease)    • Headache    • Hypertension    • Kidney stone    • Mixed hyperlipidemia 1/20/2023   • PONV (postoperative nausea and vomiting)    • PVC's (premature ventricular contractions)    • Thyroid disease     NODULES   • Type 2 diabetes mellitus with hyperglycemia, without long-term current use of insulin 1/20/2023     Past Surgical History:   Procedure Laterality Date   • COLONOSCOPY     • COLONOSCOPY N/A 5/19/2021    Procedure: COLONOSCOPY FOR SCREENING;  Surgeon: Carmel Lang MD;  Location: [facility] OR;  Service: Gastroenterology;  Laterality: N/A;   • ENDOSCOPY     • ENDOSCOPY N/A 5/19/2021    Procedure: ESOPHAGOGASTRODUODENOSCOPY WITH BIOPSY;  Surgeon: Carmel Lang MD;  Location: [facility]  "OR;  Service: Gastroenterology;  Laterality: N/A;   • GALLBLADDER SURGERY     • OVARY SURGERY Right    • TUBAL ABDOMINAL LIGATION       Family History   Problem Relation Age of Onset   • Heart failure Mother    • Heart disease Mother    • Arthritis Mother    • Diabetes Mother    • Cancer Mother    • Hypertension Mother    • Heart disease Sister    • Cancer Sister    • Heart disease Brother    • Diabetes Son    • Breast cancer Neg Hx      Social History     Tobacco Use   • Smoking status: Former     Current packs/day: 0.00     Average packs/day: 1.5 packs/day for 15.0 years (22.5 ttl pk-yrs)     Types: Cigarettes     Start date: 2/23/1994     Quit date: 2/23/2009     Years since quitting: 15.6   • Smokeless tobacco: Never   Vaping Use   • Vaping status: Never Used   Substance Use Topics   • Alcohol use: Never   • Drug use: Never     Review of Systems   Constitutional: Positive for malaise/fatigue. Negative for chills, diaphoresis and fever.   Eyes:  Positive for visual disturbance.   Cardiovascular:  Positive for leg swelling. Negative for chest pain, orthopnea, palpitations and paroxysmal nocturnal dyspnea.   Respiratory:  Positive for shortness of breath. Negative for cough and hemoptysis.    Endocrine: Negative for cold intolerance and heat intolerance.   Hematologic/Lymphatic: Does not bruise/bleed easily.   Skin:  Negative for rash.   Musculoskeletal:  Positive for back pain and joint pain. Negative for myalgias.   Gastrointestinal:  Positive for abdominal pain, change in bowel habit, constipation, diarrhea and heartburn. Negative for nausea and vomiting.   Genitourinary:  Positive for bladder incontinence and dysuria. Negative for hematuria.   Neurological:  Positive for dizziness, headaches, numbness and paresthesias. Negative for focal weakness.     Objective  Blood pressure 152/70, pulse 59, resp. rate 16, height 162.6 cm (64\"), weight 106 kg (234 lb 9.6 oz), SpO2 96%.  Body mass index is 40.27 " kg/m².    Vitals reviewed.   Constitutional:       Appearance: Well-developed.   Eyes:      Conjunctiva/sclera: Conjunctivae normal.   HENT:      Head: Normocephalic.   Neck:      Thyroid: No thyromegaly.      Vascular: No JVD.      Trachea: No tracheal deviation.   Pulmonary:      Effort: No respiratory distress.      Breath sounds: Normal breath sounds. No wheezing. No rales.   Cardiovascular:      PMI at left midclavicular line. Normal rate. Regular rhythm. Normal S1. Normal S2.       Murmurs: There is no murmur.      No gallop.  No click. No rub.   Pulses:     Intact distal pulses.   Edema:     Peripheral edema absent.   Abdominal:      General: Bowel sounds are normal.      Palpations: Abdomen is soft. There is no abdominal mass.      Tenderness: There is no abdominal tenderness.   Musculoskeletal:      Cervical back: Normal range of motion and neck supple. Skin:     General: Skin is warm and dry.   Neurological:      Mental Status: Alert and oriented to person, place, and time.      Cranial Nerves: No cranial nerve deficit.       Lab Results   Component Value Date     06/16/2024    K 4.4 06/16/2024     06/16/2024    CO2 24.6 06/16/2024    BUN 9 06/16/2024    CREATININE 0.62 06/16/2024    GLUCOSE 156 (H) 06/16/2024    CALCIUM 9.2 06/16/2024    AST 33 (H) 06/16/2024    ALT 34 (H) 06/16/2024    ALKPHOS 70 06/16/2024    LABIL2 2.1 02/14/2023       Lab Results   Component Value Date    WBC 4.18 06/16/2024    HGB 13.8 06/16/2024    HCT 43.3 06/16/2024     06/16/2024       Lab Results   Component Value Date    TSH 1.070 09/08/2022    CHLPL 184 02/14/2023    TRIG 282 (H) 02/14/2023    HDL 36 (L) 02/14/2023     02/14/2023        ECG 12 Lead    Date/Time: 10/7/2024 11:27 AM  Performed by: Song West MD    Authorized by: Song West MD  Comparison: compared with previous ECG from 6/16/2024  Similar to previous ECG  Rhythm: sinus bradycardia  BPM: 58  Conduction: conduction normal  ST  Segments: ST segments normal  T Waves: T waves normal            Assessment & Plan   Diagnosis Plan   1. Dyspnea on exertion        2. Essential hypertension  Basic Metabolic Panel      3. Bradycardia, sinus            Recommendations  Orders Placed This Encounter   Procedures   • Basic Metabolic Panel   • ECG 12 Lead      Will obtain echo Doppler study to evaluate her ventricular valve function and make further recommendations accordingly.  Add losartan 50 mg daily for her elevated blood pressures.  Check BMP in a week    Return in about 2 months (around 12/7/2024) for or sooner if needed.    As always, Keturah Berry, MICHAEL  I appreciate very much the opportunity to participate in the cardiovascular care of your patients. Please do not hesitate to call me with any questions with regards to Ana Lilia Leach's evaluation and management.     With Best Regards,        Song West MD, FACC    Please note that portions of this note were completed with a voice recognition program.

## 2024-10-15 ENCOUNTER — TRANSCRIBE ORDERS (OUTPATIENT)
Dept: ADMINISTRATIVE | Facility: HOSPITAL | Age: 64
End: 2024-10-15
Payer: MEDICARE

## 2024-10-15 DIAGNOSIS — Z86.39 HISTORY OF THYROID CYST: Primary | ICD-10-CM

## 2024-11-01 ENCOUNTER — HOSPITAL ENCOUNTER (OUTPATIENT)
Dept: CT IMAGING | Facility: HOSPITAL | Age: 64
Discharge: HOME | End: 2024-11-01
Payer: MEDICARE

## 2024-11-01 ENCOUNTER — HOSPITAL ENCOUNTER (OUTPATIENT)
Dept: ULTRASOUND IMAGING | Facility: HOSPITAL | Age: 64
Discharge: HOME | End: 2024-11-01
Payer: MEDICARE

## 2024-11-01 DIAGNOSIS — Z86.39 HX OF THYROID NODULE: ICD-10-CM

## 2024-11-01 DIAGNOSIS — N30.00 ACUTE CYSTITIS WITHOUT HEMATURIA: ICD-10-CM

## 2024-11-01 DIAGNOSIS — Q60.0 CONGENITALLY SOLITARY LEFT KIDNEY: ICD-10-CM

## 2024-11-01 DIAGNOSIS — N20.0 LEFT RENAL STONE: ICD-10-CM

## 2024-11-01 PROCEDURE — 74176 CT ABD & PELVIS W/O CONTRAST: CPT | Performed by: RADIOLOGY

## 2024-11-01 PROCEDURE — 76536 US EXAM OF HEAD AND NECK: CPT | Performed by: RADIOLOGY

## 2024-11-01 PROCEDURE — 74176 CT ABD & PELVIS W/O CONTRAST: CPT

## 2024-11-01 PROCEDURE — 76536 US EXAM OF HEAD AND NECK: CPT

## 2024-11-07 ENCOUNTER — TELEPHONE (OUTPATIENT)
Dept: CARDIOLOGY | Facility: CLINIC | Age: 64
End: 2024-11-07
Payer: MEDICARE

## 2024-11-07 ENCOUNTER — TELEPHONE (OUTPATIENT)
Dept: UROLOGY | Facility: CLINIC | Age: 64
End: 2024-11-07
Payer: MEDICARE

## 2024-11-07 DIAGNOSIS — R00.1 BRADYCARDIA, SINUS: ICD-10-CM

## 2024-11-07 DIAGNOSIS — I10 ESSENTIAL HYPERTENSION: ICD-10-CM

## 2024-11-07 DIAGNOSIS — R06.09 DYSPNEA ON EXERTION: Primary | ICD-10-CM

## 2024-11-07 NOTE — TELEPHONE ENCOUNTER
Called patient to check on her post clinic visit and to discuss her CT scan results showing multiple obstructing 1.5 cm right distal ureteral stone and 2 mm left distal ureteral stone patient reports she is in no apparent discomfort.  Denies any hematuria.  States she is aware of her stones but elects not to have any surgical intervention at this time.  Kidney function has been within normal limits last results of June BUN 9 creatinine 0.62 with an EGFR of 100.4    She will continue to follow with and monitor as indicated.    Appreciative of call    FINDINGS:    LUNG BASES:  Unremarkable as visualized.  No mass.  No consolidation.      ABDOMEN:    LIVER:  Unremarkable as visualized.    GALLBLADDER AND BILE DUCTS:  Cholecystectomy clips.  No ductal  dilation.    PANCREAS:  Unremarkable as visualized.  No ductal dilation.    SPLEEN:  Unremarkable as visualized.  No splenomegaly.    ADRENALS:  Unremarkable as visualized.  No mass.    KIDNEYS AND URETERS:  Multiple stones of the right kidney that appears  very atrophic. Probably secondary to a mid distal ureteral calculus that  is been chronically obstructing and measures 1.5 cm.  Left renal  staghorn calcification.  Questionable 2 mm left distal ureteral  calculus.    STOMACH AND BOWEL:  Unremarkable as visualized.  No obstruction.  No  mucosal thickening.      PELVIS:    APPENDIX:  No findings to suggest acute appendicitis.    BLADDER:  Unremarkable as visualized.  No stones.    REPRODUCTIVE:  Unremarkable as visualized.      ABDOMEN and PELVIS:    INTRAPERITONEAL SPACE:  Unremarkable as visualized.  No free air.  No  significant fluid collection.    BONES/JOINTS:  No acute fracture.  No dislocation.    SOFT TISSUES:  Unremarkable as visualized.    VASCULATURE:  Unremarkable as visualized.  No abdominal aortic  aneurysm.    LYMPH NODES:  Unremarkable as visualized.  No enlarged lymph nodes.     IMPRESSION:  1.  Multiple stones of the right kidney that appears very  atrophic.  Probably secondary to a mid distal ureteral calculus that is been  chronically obstructing and measures 1.5 cm.  2.  Left renal staghorn calcification.  3.  Questionable 2 mm left distal ureteral calculus.

## 2024-11-07 NOTE — TELEPHONE ENCOUNTER
Caller: Ana Lilia Leach    Relationship: Self    Best call back number: [phone]    What is the best time to reach you: ANY    What was the call regarding: IS THE PATIENT HAVING AN ECHO AT THE OFFICE ON THE 12.11.2024 SCHEDULED DATE? PLEASE CALL THE PATIENT TO ADVISE.     Is it okay if the provider responds through MyChart: NO

## 2024-11-07 NOTE — TELEPHONE ENCOUNTER
Called pt and advised her that is her apt to go over her echo. She expressed understanding. .    The order for her echo was not placed.

## 2024-11-26 ENCOUNTER — HOSPITAL ENCOUNTER (OUTPATIENT)
Dept: CARDIOLOGY | Facility: HOSPITAL | Age: 64
Discharge: HOME | End: 2024-11-26
Admitting: NURSE PRACTITIONER
Payer: MEDICARE

## 2024-11-26 DIAGNOSIS — I10 ESSENTIAL HYPERTENSION: ICD-10-CM

## 2024-11-26 DIAGNOSIS — R00.1 BRADYCARDIA, SINUS: ICD-10-CM

## 2024-11-26 DIAGNOSIS — R06.09 DYSPNEA ON EXERTION: ICD-10-CM

## 2024-11-26 LAB
BH CV ECHO MEAS - AO MAX PG: 8.1 MMHG
BH CV ECHO MEAS - AO MEAN PG: 5 MMHG
BH CV ECHO MEAS - AO ROOT DIAM: 3 CM
BH CV ECHO MEAS - AO V2 MAX: 142 CM/SEC
BH CV ECHO MEAS - AO V2 VTI: 36.1 CM
BH CV ECHO MEAS - EDV(CUBED): 79.5 ML
BH CV ECHO MEAS - EDV(MOD-SP4): 57.1 ML
BH CV ECHO MEAS - EF(MOD-SP4): 72.2 %
BH CV ECHO MEAS - ESV(CUBED): 54.9 ML
BH CV ECHO MEAS - ESV(MOD-SP4): 15.9 ML
BH CV ECHO MEAS - FS: 11.6 %
BH CV ECHO MEAS - IVS/LVPW: 0.95 CM
BH CV ECHO MEAS - IVSD: 1.9 CM
BH CV ECHO MEAS - LA DIMENSION: 3.5 CM
BH CV ECHO MEAS - LAT PEAK E' VEL: 7.3 CM/SEC
BH CV ECHO MEAS - LV DIASTOLIC VOL/BSA (35-75): 27.3 CM2
BH CV ECHO MEAS - LV MASS(C)D: 393.2 GRAMS
BH CV ECHO MEAS - LV SYSTOLIC VOL/BSA (12-30): 7.6 CM2
BH CV ECHO MEAS - LVIDD: 4.3 CM
BH CV ECHO MEAS - LVIDS: 3.8 CM
BH CV ECHO MEAS - LVOT AREA: 2.5 CM2
BH CV ECHO MEAS - LVOT DIAM: 1.8 CM
BH CV ECHO MEAS - LVPWD: 2 CM
BH CV ECHO MEAS - MED PEAK E' VEL: 6.7 CM/SEC
BH CV ECHO MEAS - MV A MAX VEL: 80.4 CM/SEC
BH CV ECHO MEAS - MV E MAX VEL: 81.5 CM/SEC
BH CV ECHO MEAS - MV E/A: 1.01
BH CV ECHO MEAS - PA ACC TIME: 0.09 SEC
BH CV ECHO MEAS - SV(MOD-SP4): 41.2 ML
BH CV ECHO MEAS - SVI(MOD-SP4): 19.7 ML/M2
BH CV ECHO MEAS - TAPSE (>1.6): 2.18 CM
BH CV ECHO MEASUREMENTS AVERAGE E/E' RATIO: 11.64
LEFT ATRIUM VOLUME INDEX: 28.5 ML/M2

## 2024-11-26 PROCEDURE — 93306 TTE W/DOPPLER COMPLETE: CPT

## (undated) DEVICE — TUBING, SUCTION, 1/4" X 20', STRAIGHT: Brand: MEDLINE INDUSTRIES, INC.

## (undated) DEVICE — ENDOGATOR AUXILIARY WATER JET CONNECTOR: Brand: ENDOGATOR

## (undated) DEVICE — Device

## (undated) DEVICE — THE EXACTO COLD SNARE IS INTENDED TO BE USED WITHOUT DIATHERMIC ENERGY FOR THE ENDOSCOPIC RESECTION OF POLYP TISSUE IN THE GASTROINTESTINAL TRACT.: Brand: EXACTO

## (undated) DEVICE — GOWN,REINF,POLY,ECL,PP SLV,XL: Brand: MEDLINE

## (undated) DEVICE — SINGLE PORT MANIFOLD: Brand: NEPTUNE 2

## (undated) DEVICE — SYR LUERLOK 30CC

## (undated) DEVICE — Device: Brand: DEFENDO AIR/WATER/SUCTION AND BIOPSY VALVE

## (undated) DEVICE — FRCP BX RADJAW4 NDL 2.8 240CM LG OG BX40

## (undated) DEVICE — CONN Y IRR DISP 1P/U

## (undated) DEVICE — SUCTION CANISTER, 1500CC, RIGID: Brand: DEROYAL

## (undated) DEVICE — THE BITE BLOCK MAXI, LATEX FREE STRAP IS USED TO PROTECT THE ENDOSCOPE INSERTION TUBE FROM BEING BITTEN BY THE PATIENT.